# Patient Record
Sex: FEMALE | Race: WHITE | Employment: PART TIME | URBAN - NONMETROPOLITAN AREA
[De-identification: names, ages, dates, MRNs, and addresses within clinical notes are randomized per-mention and may not be internally consistent; named-entity substitution may affect disease eponyms.]

---

## 2017-05-05 LAB — HIV AG/AB: NORMAL

## 2018-02-05 ENCOUNTER — OFFICE VISIT (OUTPATIENT)
Dept: PRIMARY CARE CLINIC | Age: 33
End: 2018-02-05
Payer: COMMERCIAL

## 2018-02-05 VITALS
RESPIRATION RATE: 16 BRPM | BODY MASS INDEX: 33.95 KG/M2 | WEIGHT: 224 LBS | HEART RATE: 72 BPM | TEMPERATURE: 97.6 F | HEIGHT: 68 IN | DIASTOLIC BLOOD PRESSURE: 74 MMHG | OXYGEN SATURATION: 98 % | SYSTOLIC BLOOD PRESSURE: 120 MMHG

## 2018-02-05 DIAGNOSIS — J02.9 SORE THROAT: ICD-10-CM

## 2018-02-05 DIAGNOSIS — J02.0 STREP PHARYNGITIS: Primary | ICD-10-CM

## 2018-02-05 LAB — S PYO AG THROAT QL: POSITIVE

## 2018-02-05 PROCEDURE — 87880 STREP A ASSAY W/OPTIC: CPT | Performed by: NURSE PRACTITIONER

## 2018-02-05 PROCEDURE — 99213 OFFICE O/P EST LOW 20 MIN: CPT | Performed by: NURSE PRACTITIONER

## 2018-02-05 RX ORDER — AMOXICILLIN 875 MG/1
875 TABLET, COATED ORAL 2 TIMES DAILY
Qty: 20 TABLET | Refills: 0 | Status: SHIPPED | OUTPATIENT
Start: 2018-02-05 | End: 2018-02-15

## 2018-02-05 ASSESSMENT — ENCOUNTER SYMPTOMS
SHORTNESS OF BREATH: 0
RHINORRHEA: 1
SORE THROAT: 1
SINUS PRESSURE: 0
COUGH: 0
WHEEZING: 0

## 2018-02-05 NOTE — PROGRESS NOTES
Subjective:      Patient ID: Art Quintero is a 33 y.o. female. Pharyngitis   This is a new problem. The current episode started yesterday. The problem occurs constantly. The problem has been unchanged. Associated symptoms include chills, congestion, a fever, headaches and a sore throat. Pertinent negatives include no chest pain or coughing. The symptoms are aggravated by swallowing, drinking and eating. Treatments tried: dayquil. The treatment provided mild relief. Review of Systems   Constitutional: Positive for chills and fever. Negative for activity change. HENT: Positive for congestion, rhinorrhea and sore throat. Negative for postnasal drip and sinus pressure. Respiratory: Negative for cough, shortness of breath and wheezing. Cardiovascular: Negative for chest pain and palpitations. Neurological: Positive for headaches. Objective:   Physical Exam   Constitutional: She is oriented to person, place, and time. She appears well-developed and well-nourished. No distress. HENT:   Head: Normocephalic and atraumatic. Right Ear: External ear normal.   Left Ear: External ear normal.   Mouth/Throat: Uvula is midline and mucous membranes are normal. Posterior oropharyngeal erythema (moderate) present. No oropharyngeal exudate or posterior oropharyngeal edema. Eyes: Pupils are equal, round, and reactive to light. Neck: Neck supple. Cardiovascular: Normal rate, regular rhythm and normal heart sounds. Pulmonary/Chest: Effort normal and breath sounds normal.   Neurological: She is alert and oriented to person, place, and time. Nursing note and vitals reviewed. Assessment:      1. Strep pharyngitis     2.  Sore throat  POCT rapid strep A           Plan:      Amoxicillin 875mg 1 tablet BID for 10 days  Supportive care  Push fluids  Return if symptoms do not improve or worsen   Return PRN

## 2019-01-22 ENCOUNTER — OFFICE VISIT (OUTPATIENT)
Dept: FAMILY MEDICINE CLINIC | Age: 34
End: 2019-01-22
Payer: COMMERCIAL

## 2019-01-22 VITALS
BODY MASS INDEX: 32.84 KG/M2 | OXYGEN SATURATION: 99 % | HEART RATE: 64 BPM | WEIGHT: 216 LBS | SYSTOLIC BLOOD PRESSURE: 120 MMHG | DIASTOLIC BLOOD PRESSURE: 80 MMHG

## 2019-01-22 DIAGNOSIS — R21 RASH: Primary | ICD-10-CM

## 2019-01-22 PROCEDURE — 99213 OFFICE O/P EST LOW 20 MIN: CPT | Performed by: FAMILY MEDICINE

## 2019-01-22 RX ORDER — DOXYCYCLINE HYCLATE 100 MG/1
100 CAPSULE ORAL 2 TIMES DAILY
Qty: 28 CAPSULE | Refills: 0 | Status: SHIPPED | OUTPATIENT
Start: 2019-01-22 | End: 2019-02-05

## 2019-01-22 ASSESSMENT — PATIENT HEALTH QUESTIONNAIRE - PHQ9
SUM OF ALL RESPONSES TO PHQ QUESTIONS 1-9: 0
2. FEELING DOWN, DEPRESSED OR HOPELESS: 0
SUM OF ALL RESPONSES TO PHQ9 QUESTIONS 1 & 2: 0
1. LITTLE INTEREST OR PLEASURE IN DOING THINGS: 0
SUM OF ALL RESPONSES TO PHQ QUESTIONS 1-9: 0

## 2019-01-22 ASSESSMENT — ENCOUNTER SYMPTOMS: RESPIRATORY NEGATIVE: 1

## 2019-02-05 ENCOUNTER — OFFICE VISIT (OUTPATIENT)
Dept: FAMILY MEDICINE CLINIC | Age: 34
End: 2019-02-05
Payer: COMMERCIAL

## 2019-02-05 VITALS
HEIGHT: 68 IN | WEIGHT: 210.4 LBS | BODY MASS INDEX: 31.89 KG/M2 | HEART RATE: 64 BPM | RESPIRATION RATE: 16 BRPM | DIASTOLIC BLOOD PRESSURE: 78 MMHG | SYSTOLIC BLOOD PRESSURE: 112 MMHG

## 2019-02-05 DIAGNOSIS — L98.9 SKIN LESION: ICD-10-CM

## 2019-02-05 DIAGNOSIS — Z00.00 HEALTHCARE MAINTENANCE: ICD-10-CM

## 2019-02-05 DIAGNOSIS — R21 RASH: Primary | ICD-10-CM

## 2019-02-05 PROCEDURE — 99213 OFFICE O/P EST LOW 20 MIN: CPT | Performed by: FAMILY MEDICINE

## 2019-02-05 ASSESSMENT — PATIENT HEALTH QUESTIONNAIRE - PHQ9
2. FEELING DOWN, DEPRESSED OR HOPELESS: 0
1. LITTLE INTEREST OR PLEASURE IN DOING THINGS: 0
SUM OF ALL RESPONSES TO PHQ QUESTIONS 1-9: 0
SUM OF ALL RESPONSES TO PHQ9 QUESTIONS 1 & 2: 0
SUM OF ALL RESPONSES TO PHQ QUESTIONS 1-9: 0

## 2019-02-11 PROBLEM — E66.9 OBESITY (BMI 30.0-34.9): Status: ACTIVE | Noted: 2019-02-11

## 2019-02-16 ENCOUNTER — HOSPITAL ENCOUNTER (OUTPATIENT)
Dept: LAB | Age: 34
Discharge: HOME | End: 2019-02-16
Payer: COMMERCIAL

## 2019-02-16 DIAGNOSIS — Z00.00 HEALTHCARE MAINTENANCE: ICD-10-CM

## 2019-02-16 LAB
CHOLESTEROL/HDL RATIO: 2.3
CHOLESTEROL: 142 MG/DL
GLUCOSE FASTING: 92 MG/DL (ref 70–99)
HDLC SERPL-MCNC: 61 MG/DL
LDL CHOLESTEROL: 71 MG/DL (ref 0–130)
TRIGL SERPL-MCNC: 49 MG/DL
VLDLC SERPL CALC-MCNC: NORMAL MG/DL (ref 1–30)

## 2019-02-16 PROCEDURE — 80061 LIPID PANEL: CPT

## 2019-02-16 PROCEDURE — 36415 COLL VENOUS BLD VENIPUNCTURE: CPT

## 2019-02-16 PROCEDURE — 82947 ASSAY GLUCOSE BLOOD QUANT: CPT

## 2019-03-04 ENCOUNTER — OFFICE VISIT (OUTPATIENT)
Dept: PRIMARY CARE CLINIC | Age: 34
End: 2019-03-04
Payer: COMMERCIAL

## 2019-03-04 VITALS
SYSTOLIC BLOOD PRESSURE: 110 MMHG | DIASTOLIC BLOOD PRESSURE: 78 MMHG | OXYGEN SATURATION: 100 % | HEART RATE: 67 BPM | BODY MASS INDEX: 31.46 KG/M2 | HEIGHT: 68 IN | TEMPERATURE: 97.1 F | WEIGHT: 207.6 LBS | RESPIRATION RATE: 16 BRPM

## 2019-03-04 DIAGNOSIS — J02.0 STREP THROAT: ICD-10-CM

## 2019-03-04 DIAGNOSIS — J02.9 SORE THROAT: Primary | ICD-10-CM

## 2019-03-04 LAB — S PYO AG THROAT QL: POSITIVE

## 2019-03-04 PROCEDURE — 87880 STREP A ASSAY W/OPTIC: CPT | Performed by: NURSE PRACTITIONER

## 2019-03-04 PROCEDURE — 99213 OFFICE O/P EST LOW 20 MIN: CPT | Performed by: NURSE PRACTITIONER

## 2019-03-04 RX ORDER — AMOXICILLIN 875 MG/1
875 TABLET, COATED ORAL 2 TIMES DAILY
Qty: 20 TABLET | Refills: 0 | Status: SHIPPED | OUTPATIENT
Start: 2019-03-04 | End: 2019-03-14

## 2019-03-04 ASSESSMENT — ENCOUNTER SYMPTOMS
COUGH: 1
SORE THROAT: 1
RHINORRHEA: 1

## 2019-03-14 ENCOUNTER — OFFICE VISIT (OUTPATIENT)
Dept: PRIMARY CARE CLINIC | Age: 34
End: 2019-03-14
Payer: COMMERCIAL

## 2019-03-14 VITALS
DIASTOLIC BLOOD PRESSURE: 86 MMHG | HEIGHT: 68 IN | WEIGHT: 199.8 LBS | BODY MASS INDEX: 30.28 KG/M2 | SYSTOLIC BLOOD PRESSURE: 124 MMHG | RESPIRATION RATE: 16 BRPM | TEMPERATURE: 98.5 F | HEART RATE: 74 BPM

## 2019-03-14 DIAGNOSIS — R21 RASH: Primary | ICD-10-CM

## 2019-03-14 PROCEDURE — 99213 OFFICE O/P EST LOW 20 MIN: CPT | Performed by: FAMILY MEDICINE

## 2019-03-14 ASSESSMENT — PATIENT HEALTH QUESTIONNAIRE - PHQ9
2. FEELING DOWN, DEPRESSED OR HOPELESS: 0
SUM OF ALL RESPONSES TO PHQ9 QUESTIONS 1 & 2: 0
SUM OF ALL RESPONSES TO PHQ QUESTIONS 1-9: 0
1. LITTLE INTEREST OR PLEASURE IN DOING THINGS: 0
SUM OF ALL RESPONSES TO PHQ QUESTIONS 1-9: 0

## 2019-04-15 ENCOUNTER — OFFICE VISIT (OUTPATIENT)
Dept: PRIMARY CARE CLINIC | Age: 34
End: 2019-04-15
Payer: COMMERCIAL

## 2019-04-15 VITALS
DIASTOLIC BLOOD PRESSURE: 76 MMHG | SYSTOLIC BLOOD PRESSURE: 110 MMHG | HEART RATE: 80 BPM | RESPIRATION RATE: 18 BRPM | TEMPERATURE: 98.8 F | OXYGEN SATURATION: 99 % | HEIGHT: 68 IN | BODY MASS INDEX: 29.49 KG/M2 | WEIGHT: 194.6 LBS

## 2019-04-15 DIAGNOSIS — R09.81 NASAL CONGESTION: ICD-10-CM

## 2019-04-15 DIAGNOSIS — J02.9 SORE THROAT: Primary | ICD-10-CM

## 2019-04-15 LAB — S PYO AG THROAT QL: NORMAL

## 2019-04-15 PROCEDURE — 87880 STREP A ASSAY W/OPTIC: CPT | Performed by: NURSE PRACTITIONER

## 2019-04-15 PROCEDURE — 99213 OFFICE O/P EST LOW 20 MIN: CPT | Performed by: NURSE PRACTITIONER

## 2019-04-15 RX ORDER — FLUTICASONE PROPIONATE 50 MCG
2 SPRAY, SUSPENSION (ML) NASAL DAILY
Qty: 1 BOTTLE | Refills: 0 | Status: SHIPPED | OUTPATIENT
Start: 2019-04-15 | End: 2021-03-10

## 2019-04-15 ASSESSMENT — ENCOUNTER SYMPTOMS
SINUS PRESSURE: 1
SINUS PAIN: 1
SORE THROAT: 1
RESPIRATORY NEGATIVE: 1

## 2019-04-15 NOTE — PROGRESS NOTES
is warm. Psychiatric: She has a normal mood and affect. Data reviewed:      ASSESSMENT:   Diagnosis Orders   1. Sore throat  POCT rapid strep A   2. Nasal congestion         PLAN:  Return if symptoms worsen or fail to improve.        Orders Placed This Encounter   Medications    fluticasone (FLONASE) 50 MCG/ACT nasal spray     Si sprays by Nasal route daily     Dispense:  1 Bottle     Refill:  0     Start Flonase  Continue OTC dayquil/motrin etc  Salt water gargles  Rapid strep negative      Electronically signed by DANN Sands CNP on 4/15/19 at 10:19 AM

## 2019-12-05 ENCOUNTER — HOSPITAL ENCOUNTER (OUTPATIENT)
Dept: GENERAL RADIOLOGY | Age: 34
Discharge: HOME | End: 2019-12-07
Payer: COMMERCIAL

## 2019-12-05 ENCOUNTER — OFFICE VISIT (OUTPATIENT)
Dept: PRIMARY CARE CLINIC | Age: 34
End: 2019-12-05
Payer: COMMERCIAL

## 2019-12-05 VITALS
WEIGHT: 184 LBS | HEIGHT: 68 IN | SYSTOLIC BLOOD PRESSURE: 120 MMHG | DIASTOLIC BLOOD PRESSURE: 80 MMHG | TEMPERATURE: 98 F | HEART RATE: 74 BPM | BODY MASS INDEX: 27.89 KG/M2

## 2019-12-05 DIAGNOSIS — R22.0 SWELLING OF NOSE: ICD-10-CM

## 2019-12-05 DIAGNOSIS — R22.0 SWELLING OF NOSE: Primary | ICD-10-CM

## 2019-12-05 PROCEDURE — 70160 X-RAY EXAM OF NASAL BONES: CPT

## 2019-12-05 PROCEDURE — 99213 OFFICE O/P EST LOW 20 MIN: CPT | Performed by: NURSE PRACTITIONER

## 2019-12-05 ASSESSMENT — ENCOUNTER SYMPTOMS
WHEEZING: 0
COUGH: 0
SORE THROAT: 0
RHINORRHEA: 0
SHORTNESS OF BREATH: 0
FACIAL SWELLING: 1

## 2020-01-20 ENCOUNTER — OFFICE VISIT (OUTPATIENT)
Dept: PRIMARY CARE CLINIC | Age: 35
End: 2020-01-20
Payer: COMMERCIAL

## 2020-01-20 VITALS
SYSTOLIC BLOOD PRESSURE: 124 MMHG | DIASTOLIC BLOOD PRESSURE: 68 MMHG | HEIGHT: 68 IN | WEIGHT: 182.6 LBS | HEART RATE: 63 BPM | RESPIRATION RATE: 18 BRPM | OXYGEN SATURATION: 98 % | BODY MASS INDEX: 27.68 KG/M2 | TEMPERATURE: 98.2 F

## 2020-01-20 PROCEDURE — 99213 OFFICE O/P EST LOW 20 MIN: CPT | Performed by: NURSE PRACTITIONER

## 2020-01-20 RX ORDER — OFLOXACIN 3 MG/ML
1 SOLUTION/ DROPS OPHTHALMIC 3 TIMES DAILY
Qty: 1 BOTTLE | Refills: 0 | Status: SHIPPED | OUTPATIENT
Start: 2020-01-20 | End: 2020-01-27

## 2020-01-20 ASSESSMENT — ENCOUNTER SYMPTOMS
RESPIRATORY NEGATIVE: 1
FOREIGN BODY SENSATION: 0
NAUSEA: 0
EYE DISCHARGE: 1
DOUBLE VISION: 0
BLURRED VISION: 0
EYE REDNESS: 1
EYE PAIN: 1
VOMITING: 0
PHOTOPHOBIA: 1
EYE ITCHING: 0

## 2020-01-20 ASSESSMENT — VISUAL ACUITY: OU: 1

## 2020-01-20 NOTE — PROGRESS NOTES
[facility]             [address], [address]                        Telephone [phone]             Fax [phone]     Johanne Timmons  1985  NOW:[id number]   Date of visit:  1/20/2020    Subjective:    Johanne Timmons is a 36 y.o.  female who presents to [facility] today (1/20/2020) for evaluation of:    Chief Complaint   Patient presents with    Other     drainage from leonel.eyes started this morning/ also cough with nonproduction,sore throat started last night nasal drainage        Eye Problem    Both eyes are affected. This is a new problem. The current episode started today. The problem occurs constantly. The problem has been gradually worsening. There was no injury mechanism. The pain is mild (left eye ache). There is known exposure to pink eye. She wears contacts. Associated symptoms include an eye discharge, eye redness, photophobia and a recent URI (X 1 week with rhinorrhea, congestion, cough). Pertinent negatives include no blurred vision, double vision, fever, foreign body sensation, itching, nausea or vomiting. She has tried water for the symptoms. The treatment provided no relief. She has the following problem list:  Patient Active Problem List   Diagnosis    Obesity (BMI 30.0-34. 9)        Current medications are:  Current Outpatient Medications   Medication Sig Dispense Refill    ofloxacin (OCUFLOX) 0.3 % solution Place 1 drop into both eyes 3 times daily for 7 days 1 Bottle 0    Multiple Vitamins-Minerals (MULTIVITAMIN ADULT PO) Take by mouth daily      fluticasone (FLONASE) 50 MCG/ACT nasal spray 2 sprays by Nasal route daily (Patient not taking: Reported on 1/20/2020) 1 Bottle 0     No current facility-administered medications for this visit. She has No Known Allergies. .    She  reports that she has never smoked.  She has never used smokeless tobacco.      Objective:    Vitals:    01/20/20 0946   BP: 124/68   Pulse: 63   Resp: 18   Temp: 98.2 °F (36.8 °C)   TempSrc: Tympanic   SpO2: 98%   Weight: 182 lb 9.6 oz (82.8 kg)   Height: 5' 8\" (1.727 m)     Body mass index is 27.76 kg/m². Review of Systems   Constitutional: Negative. Negative for fever. Eyes: Positive for photophobia, pain (mild ache in left eye), discharge and redness. Negative for blurred vision, double vision, itching and visual disturbance. Respiratory: Negative. Cardiovascular: Negative. Gastrointestinal: Negative for nausea and vomiting. Physical Exam  Vitals signs and nursing note reviewed. Constitutional:       Appearance: She is well-developed. HENT:      Head: Normocephalic. Jaw: There is normal jaw occlusion. Right Ear: Hearing, tympanic membrane, ear canal and external ear normal.      Left Ear: Hearing, tympanic membrane, ear canal and external ear normal.      Nose: Rhinorrhea present. Rhinorrhea is clear. Right Turbinates: Swollen. Left Turbinates: Swollen. Right Sinus: No maxillary sinus tenderness or frontal sinus tenderness. Left Sinus: No maxillary sinus tenderness or frontal sinus tenderness. Mouth/Throat:      Lips: Pink. Mouth: Mucous membranes are moist.      Pharynx: Oropharynx is clear. Eyes:      General: Vision grossly intact. Right eye: Discharge present. Left eye: Discharge present. Extraocular Movements: Extraocular movements intact. Conjunctiva/sclera:      Right eye: Right conjunctiva is injected. Left eye: Left conjunctiva is injected. Pupils: Pupils are equal, round, and reactive to light. Neck:      Musculoskeletal: Normal range of motion and neck supple. Cardiovascular:      Rate and Rhythm: Normal rate and regular rhythm. Heart sounds: Normal heart sounds. Pulmonary:      Effort: Pulmonary effort is normal.      Breath sounds: Normal breath sounds.

## 2020-01-20 NOTE — PATIENT INSTRUCTIONS
inside the lower lid. ? Close your eye for 30 to 60 seconds to let the drops or ointment move around. ? Do not touch the ointment or dropper tip to your eyelashes or any other surface. · Do not share towels, pillows, or washcloths while you have pinkeye. When should you call for help? Call your doctor now or seek immediate medical care if:    · You have pain in your eye, not just irritation on the surface.     · You have a change in vision or loss of vision.     · You have an increase in discharge from the eye.     · Your eye has not started to improve or begins to get worse within 48 hours after you start using antibiotics.     · Pinkeye lasts longer than 7 days.    Watch closely for changes in your health, and be sure to contact your doctor if you have any problems. Where can you learn more? Go to https://[employer]. org and sign in to your [employer] account. Enter Y392 in the [facility] box to learn more about \"Pinkeye: Care Instructions. \"     If you do not have an account, please click on the \"Sign Up Now\" link. Current as of: June 26, 2019  Content Version: 12.3  © 3164-4244 [employer], [employer]orporated. Care instructions adapted under license by [facility] ([facility]). If you have questions about a medical condition or this instruction, always ask your healthcare professional. [place] [address] any warranty or liability for your use of this information.

## 2020-02-11 ENCOUNTER — OFFICE VISIT (OUTPATIENT)
Dept: PRIMARY CARE CLINIC | Age: 35
End: 2020-02-11
Payer: COMMERCIAL

## 2020-02-11 VITALS
HEART RATE: 91 BPM | HEIGHT: 68 IN | WEIGHT: 178 LBS | DIASTOLIC BLOOD PRESSURE: 70 MMHG | SYSTOLIC BLOOD PRESSURE: 104 MMHG | TEMPERATURE: 98 F | OXYGEN SATURATION: 99 % | BODY MASS INDEX: 26.98 KG/M2

## 2020-02-11 LAB — S PYO AG THROAT QL: NORMAL

## 2020-02-11 PROCEDURE — 87880 STREP A ASSAY W/OPTIC: CPT | Performed by: NURSE PRACTITIONER

## 2020-02-11 PROCEDURE — 99213 OFFICE O/P EST LOW 20 MIN: CPT | Performed by: NURSE PRACTITIONER

## 2020-02-11 ASSESSMENT — ENCOUNTER SYMPTOMS
NAUSEA: 0
ABDOMINAL PAIN: 0
GASTROINTESTINAL NEGATIVE: 1
VOMITING: 0
RESPIRATORY NEGATIVE: 1
RHINORRHEA: 0
SORE THROAT: 1
COUGH: 0
SINUS PRESSURE: 0

## 2020-02-11 ASSESSMENT — PATIENT HEALTH QUESTIONNAIRE - PHQ9
1. LITTLE INTEREST OR PLEASURE IN DOING THINGS: 0
2. FEELING DOWN, DEPRESSED OR HOPELESS: 0
SUM OF ALL RESPONSES TO PHQ9 QUESTIONS 1 & 2: 0
SUM OF ALL RESPONSES TO PHQ QUESTIONS 1-9: 0
SUM OF ALL RESPONSES TO PHQ QUESTIONS 1-9: 0

## 2020-02-11 NOTE — PROGRESS NOTES
[facility]             [address], [address]                        Telephone [phone]             Fax [phone]     Maria Dolores Alegre  1985  OHX:[id number]   Date of visit:  2/11/2020    Subjective:    Maria Dolores Alegre is a 36 y.o.  female who presents to [facility] today (2/11/2020) for evaluation of:    Chief Complaint   Patient presents with    Pharyngitis     Patient is here for a sorea throat that started about a month ago. Patient denies any other sx at this time. Pharyngitis   This is a new problem. The current episode started 1 to 4 weeks ago (X 1  month). The problem occurs constantly. The problem has been waxing and waning. Associated symptoms include a sore throat. Pertinent negatives include no abdominal pain, chest pain, chills, congestion, coughing, fatigue, fever, headaches, myalgias, nausea, rash or vomiting. The symptoms are aggravated by swallowing. Treatments tried: dayquil. The treatment provided moderate relief. She has the following problem list:  Patient Active Problem List   Diagnosis    Obesity (BMI 30.0-34. 9)        Current medications are:  Current Outpatient Medications   Medication Sig Dispense Refill    Multiple Vitamins-Minerals (MULTIVITAMIN ADULT PO) Take by mouth daily      fluticasone (FLONASE) 50 MCG/ACT nasal spray 2 sprays by Nasal route daily (Patient not taking: Reported on 1/20/2020) 1 Bottle 0     No current facility-administered medications for this visit. She has No Known Allergies. .    She  reports that she has never smoked.  She has never used smokeless tobacco.      Objective:    Vitals:    02/11/20 0916   BP: 104/70   Site: Left Upper Arm   Position: Sitting   Cuff Size: Medium Adult   Pulse: 91   Temp: 98 °F (36.7 °C)   TempSrc: Tympanic   SpO2: 99%   Weight: 178 lb (80.7 kg)   Height: 5' 8\" (1.727 m)     Body mass index is 27.06 kg/m². Review of Systems   Constitutional: Negative. Negative for appetite change, chills, fatigue and fever. HENT: Positive for sore throat. Negative for congestion, postnasal drip, rhinorrhea and sinus pressure. Respiratory: Negative. Negative for cough. Cardiovascular: Negative. Negative for chest pain. Gastrointestinal: Negative. Negative for abdominal pain, nausea and vomiting. Musculoskeletal: Negative for myalgias. Skin: Negative for rash. Neurological: Negative for headaches. Physical Exam  Vitals signs and nursing note reviewed. Constitutional:       Appearance: She is well-developed. HENT:      Head: Normocephalic. Jaw: There is normal jaw occlusion. Right Ear: Hearing, tympanic membrane, ear canal and external ear normal.      Left Ear: Hearing, tympanic membrane, ear canal and external ear normal.      Nose: Nose normal.      Right Turbinates: Swollen. Left Turbinates: Swollen. Right Sinus: No maxillary sinus tenderness or frontal sinus tenderness. Left Sinus: No maxillary sinus tenderness or frontal sinus tenderness. Mouth/Throat:      Lips: Pink. Mouth: Mucous membranes are moist.      Pharynx: Uvula midline. Posterior oropharyngeal erythema present. Eyes:      Pupils: Pupils are equal, round, and reactive to light. Neck:      Musculoskeletal: Normal range of motion and neck supple. Cardiovascular:      Rate and Rhythm: Normal rate and regular rhythm. Heart sounds: Normal heart sounds. Pulmonary:      Effort: Pulmonary effort is normal.      Breath sounds: Normal breath sounds. Lymphadenopathy:      Cervical: No cervical adenopathy. Skin:     General: Skin is warm and dry. Neurological:      Mental Status: She is alert and oriented to person, place, and time. Psychiatric:         Behavior: Behavior normal.         Thought Content:  Thought content normal.       Assessment and Plan:    Results for POC orders

## 2020-06-01 ENCOUNTER — OFFICE VISIT (OUTPATIENT)
Dept: OBGYN | Age: 35
End: 2020-06-01
Payer: COMMERCIAL

## 2020-06-01 ENCOUNTER — HOSPITAL ENCOUNTER (OUTPATIENT)
Age: 35
Setting detail: SPECIMEN
Discharge: HOME | End: 2020-06-01
Payer: COMMERCIAL

## 2020-06-01 VITALS
DIASTOLIC BLOOD PRESSURE: 64 MMHG | HEART RATE: 66 BPM | BODY MASS INDEX: 27.89 KG/M2 | TEMPERATURE: 96 F | HEIGHT: 68 IN | SYSTOLIC BLOOD PRESSURE: 108 MMHG | WEIGHT: 184 LBS

## 2020-06-01 LAB
CLUE CELLS: ABNORMAL
HYPHAL YEAST: PRESENT
KOH RESULT: NEGATIVE
TRICHOMONAS: NEGATIVE
WET PREP: ABNORMAL
WHITE BLOOD CELLS: NEGATIVE

## 2020-06-01 PROCEDURE — 87070 CULTURE OTHR SPECIMN AEROBIC: CPT

## 2020-06-01 PROCEDURE — 87624 HPV HI-RISK TYP POOLED RSLT: CPT

## 2020-06-01 PROCEDURE — 87210 SMEAR WET MOUNT SALINE/INK: CPT | Performed by: NURSE PRACTITIONER

## 2020-06-01 PROCEDURE — G0145 SCR C/V CYTO,THINLAYER,RESCR: HCPCS

## 2020-06-01 PROCEDURE — 99385 PREV VISIT NEW AGE 18-39: CPT | Performed by: NURSE PRACTITIONER

## 2020-06-01 RX ORDER — METRONIDAZOLE 500 MG/1
500 TABLET ORAL 2 TIMES DAILY WITH MEALS
Qty: 14 TABLET | Refills: 0 | Status: SHIPPED | OUTPATIENT
Start: 2020-06-01 | End: 2020-06-08

## 2020-06-01 NOTE — PROGRESS NOTES
facility-administered medications for this visit. ALLERGIES:  Allergies as of 06/01/2020    (No Known Allergies)       Gynecologic History:  Menarche: 13   Menopause at n/a      Patient's last menstrual period was 05/12/2020. Hormone Exposure: No    Family History of Breast, Ovarian , Colon or Uterine Cancer: Yes      Preventative Health Testing:  Date of Last Pap Smear: 2016    ________________________________________________________________________      Review Of Systems:  General ROS:  negative  Hematological and Lymphatic ROS:negative   Breast ROS: negative  Cardiovascular ROS: negative  Respiratory ROS: negative   Gastrointestinal ROS: negative  Genito-Urinary ROS: positive for vaginal irritation  Psychological ROS: negative  Neurological ROS: negative  Musculoskeletal ROS: negative  Dermatological ROS: negative                                                                                                                                                                                   PHYSICAL Exam:     Constitutional:  Blood pressure 108/64, pulse 66, temperature 96 °F (35.6 °C), temperature source Temporal, height 5' 8\" (1.727 m), weight 184 lb (83.5 kg), last menstrual period 05/12/2020, not currently breastfeeding. General Appearance: This  is a well Developed, well Nourished, well groomed female. Her BMI was reviewed. Skin:  There was a Normal Inspection of the skin without rashes or lesions. There were no rashes. (Papular, Maculopapular, Hives, Pustular, Macular)     There were no lesions (Ulcers, Erythema, Abn. Appearing Nevi)      Lymphatic:  No Lymph Nodes were Palpable in the neck , axilla or groin. Neck and EENT:  The neck was supple. There were no masses   The thyroid was not enlarged and had no masses. PERRLA, Nares Patent No Masses    Respiratory: The lungs were auscultated and found to be clear. There were no rales, rhonchi or wheezes.  There was a good respiratory effort. Cardiovascular: The heart was in a regular rate and rhythm. . No S3 or S4. There was no murmur appreciated. Extremities: The patients extremities were without calf tenderness, edema, or varicosities. There was full range of motion in all four extremities. Pulses in all four extremities    Abdomen: The abdomen was soft and non-tender. There were good bowel sounds in all quadrants and there was no guarding, rebound or rigidity. On evaluation there was no evidence of hepatosplenomegaly and there was no costal vertebral julio cesar tenderness bilaterally. No hernias were appreciated. Psych: The patient had a normal Orientation to: Time, Place, Person, and Situation  Mood and affect appropriate    Breast:  (Chest)  normal appearance, no masses or tenderness, Inspection negative, No nipple retraction or dimpling, No nipple discharge or bleeding, No axillary or supraclavicular adenopathy, Normal to palpation without dominant masses      Pelvic Exam:  External genitalia: normal general appearance  Urinary system: urethral meatus normal  Vaginal: normal without tenderness, induration or masses and discharge, yellow  Cervix: normal appearance and thin prep PAP obtained  Adnexa: normal bimanual exam and non palpable  Uterus: normal single, nontender    Saline prep positive for clue cells and monilia. Vaginal culture obtained    Musculosk:  Normal Gait and station was noted. Digits were evaluated without abnormal findings. Range of motion, stability and strength were evaluated and found to be appropriate for the patients age. ASSESSMENT:      30 y.o. Annual   Diagnosis Orders   1. Well female exam with routine gynecological exam  PAP SMEAR    CBC Auto Differential    Urinalysis with Microscopic    Comprehensive Metabolic Panel   2. Screening for cervical cancer     3. Screening for thyroid disorder  TSH without Reflex    T4, Free   4. Screening for lipoid disorders  Lipid Panel   5.  Encounter for vitamin  TSH without Reflex     Standing Status:   Future     Standing Expiration Date:   10/1/2020    T4, Free     Standing Status:   Future     Standing Expiration Date:   10/1/2020    Vitamin D 25 Hydroxy     Standing Status:   Future     Standing Expiration Date:   9/1/2020   James Nelson POCT Wet Prep       Calista Rowell  6/1/2020

## 2020-06-04 LAB
CULTURE: NORMAL
Lab: NORMAL
SPECIMEN DESCRIPTION: NORMAL

## 2020-06-09 ENCOUNTER — TELEPHONE (OUTPATIENT)
Dept: OBGYN | Age: 35
End: 2020-06-09

## 2020-06-09 LAB — CYTOLOGY REPORT: NORMAL

## 2020-06-09 NOTE — TELEPHONE ENCOUNTER
Called patient and reminded that there are labs that need to be done yet for [facility]. Patient states she will be in this week or the next 2 weeks after vacation.  Patient needs Lipid, CMP, TSH, Vit D, T4, CBC with Diff, and UA

## 2020-06-12 LAB
HPV SAMPLE: NORMAL
HPV, GENOTYPE 16: NOT DETECTED
HPV, GENOTYPE 18: NOT DETECTED
HPV, HIGH RISK OTHER: NOT DETECTED
HPV, INTERPRETATION: NORMAL
SPECIMEN DESCRIPTION: NORMAL

## 2020-07-10 ENCOUNTER — PATIENT MESSAGE (OUTPATIENT)
Dept: OBGYN | Age: 35
End: 2020-07-10

## 2020-07-13 NOTE — TELEPHONE ENCOUNTER
From: Judi Robles  To: DANN Ibrahim - CNP  Sent: 7/10/2020 1:29 PM EDT  Subject: Non-Urgent Medical Question    Hi Hawk Piper,    I still need to come in for my labwork that you placed an order for (we just got back from visiting family). Before I do that though, I remember that you recommend I call into my insurance to make sure it is covered. Is there a name for this labwork that would help my clarify for them what I'm asking is covered? Thank you!     Judi Robles

## 2020-07-14 ENCOUNTER — HOSPITAL ENCOUNTER (OUTPATIENT)
Dept: LAB | Age: 35
Discharge: HOME | End: 2020-07-14
Payer: COMMERCIAL

## 2020-07-14 LAB
-: ABNORMAL
ABSOLUTE EOS #: 0.07 K/UL (ref 0–0.44)
ABSOLUTE IMMATURE GRANULOCYTE: <0.03 K/UL (ref 0–0.3)
ABSOLUTE LYMPH #: 1.22 K/UL (ref 1.1–3.7)
ABSOLUTE MONO #: 0.47 K/UL (ref 0.1–1.2)
ALBUMIN SERPL-MCNC: 3.8 G/DL (ref 3.5–5.2)
ALBUMIN/GLOBULIN RATIO: 1.6 (ref 1–2.5)
ALP BLD-CCNC: 58 U/L (ref 35–104)
ALT SERPL-CCNC: 12 U/L (ref 5–33)
AMORPHOUS: ABNORMAL
ANION GAP SERPL CALCULATED.3IONS-SCNC: 11 MMOL/L (ref 9–17)
AST SERPL-CCNC: 20 U/L
BACTERIA: ABNORMAL
BASOPHILS # BLD: 1 % (ref 0–2)
BASOPHILS ABSOLUTE: 0.04 K/UL (ref 0–0.2)
BILIRUB SERPL-MCNC: 0.47 MG/DL (ref 0.3–1.2)
BILIRUBIN URINE: NEGATIVE
BUN BLDV-MCNC: 10 MG/DL (ref 6–20)
BUN/CREAT BLD: 14 (ref 9–20)
CALCIUM SERPL-MCNC: 8.8 MG/DL (ref 8.6–10.4)
CASTS UA: ABNORMAL /LPF (ref 0–2)
CHLORIDE BLD-SCNC: 102 MMOL/L (ref 98–107)
CHOLESTEROL/HDL RATIO: 2.7
CHOLESTEROL: 178 MG/DL
CO2: 27 MMOL/L (ref 20–31)
COLOR: ABNORMAL
COMMENT UA: ABNORMAL
CREAT SERPL-MCNC: 0.69 MG/DL (ref 0.5–0.9)
CRYSTALS, UA: ABNORMAL /HPF
DIFFERENTIAL TYPE: ABNORMAL
EOSINOPHILS RELATIVE PERCENT: 1 % (ref 1–4)
EPITHELIAL CELLS UA: ABNORMAL /HPF (ref 0–5)
GFR AFRICAN AMERICAN: >60 ML/MIN
GFR NON-AFRICAN AMERICAN: >60 ML/MIN
GFR SERPL CREATININE-BSD FRML MDRD: ABNORMAL ML/MIN/{1.73_M2}
GFR SERPL CREATININE-BSD FRML MDRD: ABNORMAL ML/MIN/{1.73_M2}
GLUCOSE BLD-MCNC: 93 MG/DL (ref 70–99)
GLUCOSE URINE: NEGATIVE
HCT VFR BLD CALC: 35.2 % (ref 36.3–47.1)
HDLC SERPL-MCNC: 65 MG/DL
HEMOGLOBIN: 10.1 G/DL (ref 11.9–15.1)
IMMATURE GRANULOCYTES: 0 %
KETONES, URINE: NEGATIVE
LDL CHOLESTEROL: 94 MG/DL (ref 0–130)
LEUKOCYTE ESTERASE, URINE: NEGATIVE
LYMPHOCYTES # BLD: 23 % (ref 24–43)
MCH RBC QN AUTO: 18.8 PG (ref 25.2–33.5)
MCHC RBC AUTO-ENTMCNC: 28.7 G/DL (ref 25.2–33.5)
MCV RBC AUTO: 65.7 FL (ref 82.6–102.9)
MONOCYTES # BLD: 9 % (ref 3–12)
MUCUS: ABNORMAL
NITRITE, URINE: NEGATIVE
NRBC AUTOMATED: 0 PER 100 WBC
OTHER OBSERVATIONS UA: ABNORMAL
PDW BLD-RTO: 17.3 % (ref 11.8–14.4)
PH UA: 6 (ref 5–6)
PLATELET # BLD: ABNORMAL K/UL (ref 138–453)
PLATELET ESTIMATE: ABNORMAL
PLATELET, FLUORESCENCE: 248 K/UL (ref 138–453)
PLATELET, IMMATURE FRACTION: 3.7 % (ref 1.1–10.3)
PMV BLD AUTO: ABNORMAL FL (ref 8.1–13.5)
POTASSIUM SERPL-SCNC: 4.1 MMOL/L (ref 3.7–5.3)
PROTEIN UA: NEGATIVE
RBC # BLD: 5.36 M/UL (ref 3.95–5.11)
RBC # BLD: ABNORMAL 10*6/UL
RBC UA: ABNORMAL /HPF (ref 0–4)
RENAL EPITHELIAL, UA: ABNORMAL /HPF
SEG NEUTROPHILS: 66 % (ref 36–65)
SEGMENTED NEUTROPHILS ABSOLUTE COUNT: 3.51 K/UL (ref 1.5–8.1)
SODIUM BLD-SCNC: 140 MMOL/L (ref 135–144)
SPECIFIC GRAVITY UA: 1.02 (ref 1.01–1.02)
THYROXINE, FREE: 1.05 NG/DL (ref 0.93–1.7)
TOTAL PROTEIN: 6.2 G/DL (ref 6.4–8.3)
TRICHOMONAS: ABNORMAL
TRIGL SERPL-MCNC: 93 MG/DL
TSH SERPL DL<=0.05 MIU/L-ACNC: 2.32 MIU/L (ref 0.3–5)
TURBIDITY: ABNORMAL
URINE HGB: NEGATIVE
UROBILINOGEN, URINE: NORMAL
VITAMIN D 25-HYDROXY: 21.3 NG/ML (ref 30–100)
VLDLC SERPL CALC-MCNC: NORMAL MG/DL (ref 1–30)
WBC # BLD: 5.3 K/UL (ref 3.5–11.3)
WBC # BLD: ABNORMAL 10*3/UL
WBC UA: ABNORMAL /HPF (ref 0–4)
YEAST: ABNORMAL

## 2020-07-14 PROCEDURE — 83540 ASSAY OF IRON: CPT

## 2020-07-14 PROCEDURE — 83550 IRON BINDING TEST: CPT

## 2020-07-14 PROCEDURE — 85025 COMPLETE CBC W/AUTO DIFF WBC: CPT

## 2020-07-14 PROCEDURE — 82306 VITAMIN D 25 HYDROXY: CPT

## 2020-07-14 PROCEDURE — 81001 URINALYSIS AUTO W/SCOPE: CPT

## 2020-07-14 PROCEDURE — 84443 ASSAY THYROID STIM HORMONE: CPT

## 2020-07-14 PROCEDURE — 82728 ASSAY OF FERRITIN: CPT

## 2020-07-14 PROCEDURE — 80061 LIPID PANEL: CPT

## 2020-07-14 PROCEDURE — 36415 COLL VENOUS BLD VENIPUNCTURE: CPT

## 2020-07-14 PROCEDURE — 80053 COMPREHEN METABOLIC PANEL: CPT

## 2020-07-14 PROCEDURE — 85055 RETICULATED PLATELET ASSAY: CPT

## 2020-07-14 PROCEDURE — 84439 ASSAY OF FREE THYROXINE: CPT

## 2020-07-15 LAB
FERRITIN: 7 UG/L (ref 13–150)
IRON SATURATION: 7 % (ref 20–55)
IRON: 25 UG/DL (ref 37–145)
TOTAL IRON BINDING CAPACITY: 381 UG/DL (ref 250–450)
UNSATURATED IRON BINDING CAPACITY: 356 UG/DL (ref 112–347)

## 2020-07-16 RX ORDER — FERROUS SULFATE 325(65) MG
325 TABLET ORAL 2 TIMES DAILY WITH MEALS
Qty: 180 TABLET | Refills: 1 | Status: SHIPPED | OUTPATIENT
Start: 2020-07-16

## 2020-07-17 ENCOUNTER — VIRTUAL VISIT (OUTPATIENT)
Dept: FAMILY MEDICINE CLINIC | Age: 35
End: 2020-07-17
Payer: COMMERCIAL

## 2020-07-17 PROBLEM — E66.3 OVERWEIGHT (BMI 25.0-29.9): Status: ACTIVE | Noted: 2019-02-11

## 2020-07-17 PROCEDURE — 99213 OFFICE O/P EST LOW 20 MIN: CPT | Performed by: FAMILY MEDICINE

## 2020-07-17 NOTE — PROGRESS NOTES
2020    TELEHEALTH EVALUATION -- Audio/Visual (During HFFPQ-55 public health emergency)    HPI:    Lion Adams (:  1985) has requested an audio/video evaluation for the following concern(s):    She states that she has been feeling well. She saw [facility] recently for her annual exam and Pap test.  Keyon Locke ordered labs. Fede Johansen had her labs done earlier this month. The labs showed anemia. Brigida prescribed iron. She has not started the iron yet. She states that her menses are regular. She states that she bleeds for about 5 days each month. She does not feel that her blood loss is heavy. She has a history of anemia. She does not take a multivitamin routinely. Review of Systems    Prior to Visit Medications    Medication Sig Taking? Authorizing Provider   ferrous sulfate (IRON 325) 325 (65 Fe) MG tablet Take 1 tablet by mouth 2 times daily (with meals)  Patient not taking: Reported on 2020  DANN Dunne CNP   fluticasone (FLONASE) 50 MCG/ACT nasal spray 2 sprays by Nasal route daily  Patient not taking: Reported on 2020  DANN Ross CNP   Multiple Vitamins-Minerals (MULTIVITAMIN ADULT PO) Take by mouth daily  Historical Provider, MD       Social History     Tobacco Use    Smoking status: Never Smoker    Smokeless tobacco: Never Used   Substance Use Topics    Alcohol use: No    Drug use: No        She has the following allergies:  Patient has no known allergies. She has the following problem list:  Patient Active Problem List   Diagnosis    Overweight (BMI 25.0-29. 9)         PHYSICAL EXAMINATION:    Vital Signs: (As obtained by patient/caregiver or practitioner observation)    Patient-Reported Vitals 2020   Patient-Reported Weight 185lbs   Patient-Reported Height 5 8         Constitutional:   She appears well-developed and well-nourished. She is in no apparent distress. Mental status:  She is alert and awake.   She is oriented to person/place/time. She is able to follow commands. Eyes:  EOM are normal  Sclera are normal  There is no visible discharge. HENT:   Normocephalic, atraumatic. Mouth/throat: Mucous membranes are moist.    Neck: There is no visualized mass. Pulmonary/Chest: The respiratory effort is normal.  There are no visualized signs of difficulty breathing or respiratory distress. Musculoskeletal:  There is a normal gait with no signs of ataxia. There is normal range of motion of the neck. Neurological:   There is no facial asymmetry (cranial nerve 7 motor function). (Exam is limited due to video visit.)   There is no gaze palsy. Skin:   There is no significant exanthematous lesions or discoloration noted on facial skin. Psychiatric:  Affect is normal.         Other pertinent observable physical exam findings:  She responds to questions appropriately. Speech is clear. There is no observed dyspnea with conversation. Dress and grooming are appropriate.      Results of labs done 7/14/2020 were reviewed with the patient:  Hospital Outpatient Visit on 07/14/2020   Component Date Value Ref Range Status    Vit D, 25-Hydroxy 07/14/2020 21.3* 30.0 - 100.0 ng/mL Final    Comment:    Reference Range:  Vitamin D status         Range   Deficiency              <20 ng/mL   Mild Deficiency       20-30 ng/mL   Sufficiency           ng/mL   Toxicity               >100 ng/mL      Thyroxine, Free 07/14/2020 1.05  0.93 - 1.70 ng/dL Final    TSH 07/14/2020 2.32  0.30 - 5.00 mIU/L Final    Cholesterol 07/14/2020 178  <200 mg/dL Final    Comment:    Cholesterol Guidelines:      <200  Desirable   200-240  Borderline      >240  Undesirable         HDL 07/14/2020 65  >40 mg/dL Final    Comment:    HDL Guidelines:    <40     Undesirable   40-59    Borderline    >59     Desirable         LDL Cholesterol 07/14/2020 94  0 - 130 mg/dL Final    Comment:    LDL Guidelines:     <100    Desirable 100-129   Near to/above Desirable   130-159   Borderline      >159   Undesirable     Direct (measured) LDL and calculated LDL are not interchangeable tests.  Chol/HDL Ratio 07/14/2020 2.7  <5 Final            Triglycerides 07/14/2020 93  <150 mg/dL Final    Comment:    Triglyceride Guidelines:     <150   Desirable   150-199  Borderline   200-499  High     >499   Very high   Based on AHA Guidelines for fasting triglyceride, October 2012.  VLDL 07/14/2020 NOT REPORTED  1 - 30 mg/dL Final    Glucose 07/14/2020 93  70 - 99 mg/dL Final    BUN 07/14/2020 10  6 - 20 mg/dL Final    CREATININE 07/14/2020 0.69  0.50 - 0.90 mg/dL Final    Bun/Cre Ratio 07/14/2020 14  9 - 20 Final    Calcium 07/14/2020 8.8  8.6 - 10.4 mg/dL Final    Sodium 07/14/2020 140  135 - 144 mmol/L Final    Potassium 07/14/2020 4.1  3.7 - 5.3 mmol/L Final    Chloride 07/14/2020 102  98 - 107 mmol/L Final    CO2 07/14/2020 27  20 - 31 mmol/L Final    Anion Gap 07/14/2020 11  9 - 17 mmol/L Final    Alkaline Phosphatase 07/14/2020 58  35 - 104 U/L Final    ALT 07/14/2020 12  5 - 33 U/L Final    AST 07/14/2020 20  <32 U/L Final    Total Bilirubin 07/14/2020 0.47  0.3 - 1.2 mg/dL Final    Total Protein 07/14/2020 6.2* 6.4 - 8.3 g/dL Final    Alb 07/14/2020 3.8  3.5 - 5.2 g/dL Final    Albumin/Globulin Ratio 07/14/2020 1.6  1.0 - 2.5 Final    GFR Non- 07/14/2020 >60  >60 mL/min Final    GFR  07/14/2020 >60  >60 mL/min Final    GFR Comment 07/14/2020        Final    Comment: Average GFR for 30-36 years old:   80 mL/min/1.73sq m  Chronic Kidney Disease:   <60 mL/min/1.73sq m  Kidney failure:   <15 mL/min/1.73sq m              eGFR calculated using average adult body mass.  Additional eGFR calculator available at:        [email or web address].br            GFR Staging 07/14/2020 NOT REPORTED   Final    WBC 07/14/2020 5.3  3.5 - 11.3 k/uL Final    RBC 07/14/2020 5.36* 3.95 - 5.11 m/uL Final    Hemoglobin 07/14/2020 10.1* 11.9 - 15.1 g/dL Final    Hematocrit 07/14/2020 35.2* 36.3 - 47.1 % Final    MCV 07/14/2020 65.7* 82.6 - 102.9 fL Final    MCH 07/14/2020 18.8* 25.2 - 33.5 pg Final    MCHC 07/14/2020 28.7  25.2 - 33.5 g/dL Final    RDW 07/14/2020 17.3* 11.8 - 14.4 % Final    Platelets 64/07/2736 See Reflexed IPF Result  138 - 453 k/uL Final    MPV 07/14/2020 Abnormal  8.1 - 13.5 fL Final    NRBC Automated 07/14/2020 0.0  0.0 per 100 WBC Final    Differential Type 07/14/2020 NOT REPORTED   Final    WBC Morphology 07/14/2020 NOT REPORTED   Final    RBC Morphology 07/14/2020 NOT REPORTED   Final    Platelet Estimate 81/52/9475 NOT REPORTED   Final    Seg Neutrophils 07/14/2020 66* 36 - 65 % Final    Lymphocytes 07/14/2020 23* 24 - 43 % Final    Monocytes 07/14/2020 9  3 - 12 % Final    Eosinophils % 07/14/2020 1  1 - 4 % Final    Basophils 07/14/2020 1  0 - 2 % Final    Immature Granulocytes 07/14/2020 0  0 % Final    Segs Absolute 07/14/2020 3.51  1.50 - 8.10 k/uL Final    Absolute Lymph # 07/14/2020 1.22  1.10 - 3.70 k/uL Final    Absolute Mono # 07/14/2020 0.47  0.10 - 1.20 k/uL Final    Absolute Eos # 07/14/2020 0.07  0.00 - 0.44 k/uL Final    Basophils Absolute 07/14/2020 0.04  0.00 - 0.20 k/uL Final    Absolute Immature Granulocyte 07/14/2020 <0.03  0.00 - 0.30 k/uL Final    Color, UA 07/14/2020 NOT REPORTED  YELLOW Final    Turbidity UA 07/14/2020 NOT REPORTED  CLEAR Final    Glucose, Ur 07/14/2020 NEGATIVE  NEGATIVE Final    Bilirubin Urine 07/14/2020 NEGATIVE  NEGATIVE Final    Ketones, Urine 07/14/2020 NEGATIVE  NEGATIVE Final    Specific Gravity, UA 07/14/2020 1.025  1.010 - 1.025 Final    Urine Hgb 07/14/2020 NEGATIVE  NEGATIVE Final    pH, UA 07/14/2020 6.0  5.0 - 6.0 Final    Protein, UA 07/14/2020 NEGATIVE  NEGATIVE Final    Urobilinogen, Urine 07/14/2020 Normal  Normal Final    Nitrite, Urine 07/14/2020 NEGATIVE  NEGATIVE Final    Leukocyte Esterase, Urine 07/14/2020 NEGATIVE  NEGATIVE Final    Urinalysis Comments 07/14/2020 NOT REPORTED   Final    - 07/14/2020        Final    WBC, UA 07/14/2020 None  0 - 4 /HPF Final    RBC, UA 07/14/2020 0 TO 4  0 - 4 /HPF Final    Casts UA 07/14/2020 NOT REPORTED  0 - 2 /LPF Final    Crystals, UA 07/14/2020 NOT REPORTED  None /HPF Final    Epithelial Cells UA 07/14/2020 5 TO 10  0 - 5 /HPF Final    Renal Epithelial, UA 07/14/2020 NOT REPORTED  0 /HPF Final    Bacteria, UA 07/14/2020 1+* None Final    Mucus, UA 07/14/2020 4+* None Final    Trichomonas, UA 07/14/2020 NOT REPORTED  None Final    Amorphous, UA 07/14/2020 NOT REPORTED  None Final    Other Observations UA 07/14/2020 NOT REPORTED  NOT REQ. Final    Yeast, UA 07/14/2020 NOT REPORTED  None Final    Platelet, Immature Fraction 07/14/2020 3.7  1.1 - 10.3 % Final    Platelet, Fluorescence 07/14/2020 248  138 - 453 k/uL Final    Ferritin 07/14/2020 7* 13 - 150 ug/L Final    Iron 07/14/2020 25* 37 - 145 ug/dL Final    TIBC 07/14/2020 381  250 - 450 ug/dL Final    Iron Saturation 07/14/2020 7* 20 - 55 % Final    UIBC 07/14/2020 356* 112 - 347 ug/dL Final        ASSESSMENT/PLAN:  1. Iron deficiency anemia, unspecified iron deficiency anemia type  I recommended that she begin the iron that [facility] prescribed. Labs were ordered to be done in one month:  - CBC With Auto Differential; Future  - Ferritin; Future  - Iron And TIBC; Future    She will be contacted when the labs are available. 2. Vitamin D deficiency  I advised her to begin Vitamin D 2000 units daily. No follow-ups on file. Bryanna Pichardo is a 30 y.o. female being evaluated by a Virtual Visit (video visit) encounter to address concerns as mentioned above. A caregiver was present when appropriate.  Due to this being a TeleHealth encounter (During JOMNP-84 public health emergency), evaluation of the following organ systems was limited:

## 2021-03-01 ENCOUNTER — OFFICE VISIT (OUTPATIENT)
Dept: FAMILY MEDICINE CLINIC | Age: 36
End: 2021-03-01
Payer: COMMERCIAL

## 2021-03-01 VITALS
BODY MASS INDEX: 29.28 KG/M2 | HEART RATE: 56 BPM | WEIGHT: 193.2 LBS | HEIGHT: 68 IN | DIASTOLIC BLOOD PRESSURE: 78 MMHG | OXYGEN SATURATION: 100 % | TEMPERATURE: 98.4 F | SYSTOLIC BLOOD PRESSURE: 128 MMHG

## 2021-03-01 DIAGNOSIS — T50.Z95A IMMUNIZATION REACTION, INITIAL ENCOUNTER: ICD-10-CM

## 2021-03-01 DIAGNOSIS — T78.40XA ALLERGIC REACTION, INITIAL ENCOUNTER: Primary | ICD-10-CM

## 2021-03-01 PROCEDURE — 99213 OFFICE O/P EST LOW 20 MIN: CPT | Performed by: NURSE PRACTITIONER

## 2021-03-01 ASSESSMENT — VISUAL ACUITY: OU: 1

## 2021-03-01 ASSESSMENT — PATIENT HEALTH QUESTIONNAIRE - PHQ9
1. LITTLE INTEREST OR PLEASURE IN DOING THINGS: 0
SUM OF ALL RESPONSES TO PHQ QUESTIONS 1-9: 0
2. FEELING DOWN, DEPRESSED OR HOPELESS: 0
SUM OF ALL RESPONSES TO PHQ QUESTIONS 1-9: 0

## 2021-03-01 ASSESSMENT — ENCOUNTER SYMPTOMS
FACIAL SWELLING: 1
TROUBLE SWALLOWING: 0
SORE THROAT: 0
NAUSEA: 0
COUGH: 0
EYE ITCHING: 1
RHINORRHEA: 0
VOICE CHANGE: 0

## 2021-03-01 NOTE — PROGRESS NOTES
[address] [place] [address] [address] [facility], APRN-[facility]  [address]  Phone:  [phone]  Fax:  [phone]  Zayra Campbell is a 29 y.o. female who presents today for her medical conditions/complaints as noted below. Sallya Class c/o of Other (Friday the 1st dose of the vaccine was given, and by Saturday eye started to swell and the lips became itchy)      HPI:     Other  This is a new (Had Moderna vaccine on Friday.) problem. The current episode started yesterday. The problem has been waxing and waning. Associated symptoms include chills and a rash. Pertinent negatives include no arthralgias, chest pain, congestion, coughing, fever, myalgias, nausea or sore throat. Associated symptoms comments: Facial swelling and lip swelling. Lips dry and cracked. . Nothing aggravates the symptoms. Treatments tried: cortisone 10 on the face. The treatment provided no relief.        Wt Readings from Last 3 Encounters:   03/01/21 193 lb 3.2 oz (87.6 kg)   06/01/20 184 lb (83.5 kg)   02/11/20 178 lb (80.7 kg)       Temp Readings from Last 3 Encounters:   03/01/21 98.4 °F (36.9 °C)   06/01/20 96 °F (35.6 °C) (Temporal)   02/11/20 98 °F (36.7 °C) (Tympanic)       BP Readings from Last 3 Encounters:   03/01/21 128/78   06/01/20 108/64   02/11/20 104/70       Pulse Readings from Last 3 Encounters:   03/01/21 56   06/01/20 66   02/11/20 91              Past Medical History:   Diagnosis Date    Vaginal delivery 5/15/15    Girl, no complications      Past Surgical History:   Procedure Laterality Date    WISDOM TOOTH EXTRACTION      age 16 - 3 teeth     Family History   Problem Relation Age of Onset    Breast Cancer Maternal Grandmother 79     Social History     Tobacco Use    Smoking status: Never Smoker    Smokeless tobacco: Never Used   Substance Use Topics    Alcohol use: No      Current Outpatient Medications   Medication Sig Dispense Refill  ferrous sulfate (IRON 325) 325 (65 Fe) MG tablet Take 1 tablet by mouth 2 times daily (with meals) 180 tablet 1    fluticasone (FLONASE) 50 MCG/ACT nasal spray 2 sprays by Nasal route daily 1 Bottle 0    Multiple Vitamins-Minerals (MULTIVITAMIN ADULT PO) Take by mouth daily       No current facility-administered medications for this visit. No Known Allergies    No exam data present    Subjective:      Review of Systems   Constitutional: Positive for chills. Negative for fever. HENT: Positive for facial swelling. Negative for congestion, rhinorrhea, sore throat, trouble swallowing and voice change. Mild swelling of the lips of the left side of the face. Erythema surrounding the left lips. Eyes: Positive for itching. Swelling of the eyelids     Respiratory: Negative for cough. Cardiovascular: Negative for chest pain. Gastrointestinal: Negative for nausea. Musculoskeletal: Negative for arthralgias and myalgias. Skin: Positive for rash. Objective:     /78   Pulse 56   Temp 98.4 °F (36.9 °C)   Ht 5' 8\" (1.727 m)   Wt 193 lb 3.2 oz (87.6 kg)   SpO2 100%   BMI 29.38 kg/m²     Physical Exam  Vitals signs reviewed. HENT:      Head: Normocephalic. Right Ear: Tympanic membrane, ear canal and external ear normal.      Left Ear: Tympanic membrane, ear canal and external ear normal.      Nose: Nose normal.      Mouth/Throat:      Mouth: Mucous membranes are moist.      Pharynx: No oropharyngeal exudate or posterior oropharyngeal erythema. Eyes:      General: Vision grossly intact. Right eye: No discharge. Left eye: No discharge. Extraocular Movements: Extraocular movements intact. Left eye: Normal extraocular motion. Conjunctiva/sclera:      Right eye: Right conjunctiva is not injected. Left eye: Left conjunctiva is not injected. Pupils: Pupils are equal, round, and reactive to light.      Cardiovascular: Rate and Rhythm: Normal rate and regular rhythm. Pulses: Normal pulses. Heart sounds: Normal heart sounds. Pulmonary:      Effort: Pulmonary effort is normal.      Breath sounds: Normal breath sounds. No stridor. Skin:     General: Skin is warm and dry. Capillary Refill: Capillary refill takes less than 2 seconds. Neurological:      Mental Status: She is alert. Assessment:      Diagnosis Orders   1. Allergic reaction, initial encounter     2. Immunization reaction, initial encounter       No results found for this visit on 03/01/21. Plan:       Zyrtec 10 mg in the AM.  Once home - Benadryl 25 mg. Benadryl 50 mg at bedtime. May use the hydrocortisone to the face as directed. Follow up with primary care provider in 1 to 2 days if needed. Report reaction to [facility]. Discuss with Dr. Romaine Caal being vaccinated again. Patient Instructions     Zyrtec 10 mg in the AM.  Once home - Benadryl 25 mg. Benadryl 50 mg at bedtime. May use the hydrocortisone to the face as directed. Follow up with primary care provider in 1 to 2 days if needed. Report reaction to [facility]. Discuss with Dr. Romaine Caal being vaccinated again. Patient Education        Allergic Reaction: Care Instructions  Your Care Instructions     An allergic reaction is an excessive response from your immune system to a medicine, chemical, food, insect bite, or other substance. A reaction can range from mild to life-threatening. Some people have a mild rash, hives, and itching or stomach cramps. In severe reactions, swelling of your tongue and throat can close up your airway so that you cannot breathe. Follow-up care is a key part of your treatment and safety. Be sure to make and go to all appointments, and call your doctor if you are having problems. It's also a good idea to know your test results and keep a list of the medicines you take. How can you care for yourself at home? · If you know what caused your allergic reaction, be sure to avoid it. Your allergy may become more severe each time you have a reaction. · Take an over-the-counter antihistamine, such as cetirizine (Zyrtec) or loratadine (Claritin), to treat mild symptoms. Read and follow directions on the label. Some antihistamines can make you feel sleepy. Do not give antihistamines to a child unless you have checked with your doctor first. Mild symptoms include sneezing or an itchy or runny nose; an itchy mouth; a few hives or mild itching; and mild nausea or stomach discomfort. · Do not scratch hives or a rash. Put a cold, moist towel on them or take cool baths to relieve itching. Put ice packs on hives, swelling, or insect stings for 10 to 15 minutes at a time. Put a thin cloth between the ice pack and your skin. Do not take hot baths or showers. They will make the itching worse. · Your doctor may prescribe a shot of epinephrine to carry with you in case you have a severe reaction. Learn how to give yourself the shot and keep it with you at all times. Make sure it is not . · Go to the emergency room every time you have a severe reaction, even if you have used your shot of epinephrine and are feeling better. Symptoms can come back after a shot. · Wear medical alert jewelry that lists your allergies. You can buy this at most drugstores. · If your child has a severe allergy, make sure that his or her teachers, babysitters, coaches, and other caregivers know about the allergy. They should have an epinephrine shot, know how and when to give it, and have a plan to take your child to the hospital.  When should you call for help? Give an epinephrine shot if:    · You think you are having a severe allergic reaction.     · You have symptoms in more than one body area, such as mild nausea and an itchy mouth. After giving an epinephrine shot call 911, even if you feel better.   Call 911 if:   · You have symptoms of a severe allergic reaction. These may include:  ? Sudden raised, red areas (hives) all over your body. ? Swelling of the throat, mouth, lips, or tongue. ? Trouble breathing. ? Passing out (losing consciousness). Or you may feel very lightheaded or suddenly feel weak, confused, or restless.     · You have been given an epinephrine shot, even if you feel better. Call your doctor now or seek immediate medical care if:    · You have symptoms of an allergic reaction, such as:  ? A rash or hives (raised, red areas on the skin). ? Itching. ? Swelling. ? Belly pain, nausea, or vomiting. Watch closely for changes in your health, and be sure to contact your doctor if:    · You do not get better as expected. Where can you learn more? Go to https://[email or web address].[employer]iBe[employer]. org and sign in to your [employer] account. Enter F947 in the [employer] box to learn more about \"Allergic Reaction: Care Instructions. \"     If you do not have an account, please click on the \"Sign Up Now\" link. Current as of: June 29, 2020               Content Version: 12.6  © 4145-5525 [employer]. Care instructions adapted under license by [facility] ([facility]). If you have questions about a medical condition or this instruction, always ask your healthcare professional. [place] [address] any warranty or liability for your use of this information. Patient/Caregiver instructed on use, benefit, and side effects of prescribed medications. All patient/parent/caregiver questions answered. Patient/parent/caregiver voiced understanding. Reviewed health maintenance. Instructed to continue current medications, diet and exercise. Patient agreed with treatment plan. Follow up as directed.            Electronically signed by DANN Scott NP on3/1/2021

## 2021-03-01 NOTE — PATIENT INSTRUCTIONS
Zyrtec 10 mg in the AM.  Once home - Benadryl 25 mg. Benadryl 50 mg at bedtime. May use the hydrocortisone to the face as directed. Follow up with primary care provider in 1 to 2 days if needed. Report reaction to [facility]. Discuss with Dr. Kehinde Montague being vaccinated again. Patient Education        Allergic Reaction: Care Instructions  Your Care Instructions     An allergic reaction is an excessive response from your immune system to a medicine, chemical, food, insect bite, or other substance. A reaction can range from mild to life-threatening. Some people have a mild rash, hives, and itching or stomach cramps. In severe reactions, swelling of your tongue and throat can close up your airway so that you cannot breathe. Follow-up care is a key part of your treatment and safety. Be sure to make and go to all appointments, and call your doctor if you are having problems. It's also a good idea to know your test results and keep a list of the medicines you take. How can you care for yourself at home? · If you know what caused your allergic reaction, be sure to avoid it. Your allergy may become more severe each time you have a reaction. · Take an over-the-counter antihistamine, such as cetirizine (Zyrtec) or loratadine (Claritin), to treat mild symptoms. Read and follow directions on the label. Some antihistamines can make you feel sleepy. Do not give antihistamines to a child unless you have checked with your doctor first. Mild symptoms include sneezing or an itchy or runny nose; an itchy mouth; a few hives or mild itching; and mild nausea or stomach discomfort. · Do not scratch hives or a rash. Put a cold, moist towel on them or take cool baths to relieve itching. Put ice packs on hives, swelling, or insect stings for 10 to 15 minutes at a time. Put a thin cloth between the ice pack and your skin. Do not take hot baths or showers. They will make the itching worse. · Your doctor may prescribe a shot of epinephrine to carry with you in case you have a severe reaction. Learn how to give yourself the shot and keep it with you at all times. Make sure it is not . · Go to the emergency room every time you have a severe reaction, even if you have used your shot of epinephrine and are feeling better. Symptoms can come back after a shot. · Wear medical alert jewelry that lists your allergies. You can buy this at most drugstores. · If your child has a severe allergy, make sure that his or her teachers, babysitters, coaches, and other caregivers know about the allergy. They should have an epinephrine shot, know how and when to give it, and have a plan to take your child to the hospital.  When should you call for help? Give an epinephrine shot if:    · You think you are having a severe allergic reaction.     · You have symptoms in more than one body area, such as mild nausea and an itchy mouth. After giving an epinephrine shot call 911, even if you feel better. Call 911 if:    · You have symptoms of a severe allergic reaction. These may include:  ? Sudden raised, red areas (hives) all over your body. ? Swelling of the throat, mouth, lips, or tongue. ? Trouble breathing. ? Passing out (losing consciousness). Or you may feel very lightheaded or suddenly feel weak, confused, or restless.     · You have been given an epinephrine shot, even if you feel better. Call your doctor now or seek immediate medical care if:    · You have symptoms of an allergic reaction, such as:  ? A rash or hives (raised, red areas on the skin). ? Itching. ? Swelling. ? Belly pain, nausea, or vomiting. Watch closely for changes in your health, and be sure to contact your doctor if:    · You do not get better as expected. Where can you learn more?

## 2021-03-02 DIAGNOSIS — T50.Z95A ADVERSE EFFECT OF VACCINE, INITIAL ENCOUNTER: Primary | ICD-10-CM

## 2021-03-03 ENCOUNTER — TELEPHONE (OUTPATIENT)
Dept: FAMILY MEDICINE CLINIC | Age: 36
End: 2021-03-03

## 2021-03-10 ENCOUNTER — OFFICE VISIT (OUTPATIENT)
Dept: FAMILY MEDICINE CLINIC | Age: 36
End: 2021-03-10
Payer: COMMERCIAL

## 2021-03-10 VITALS
SYSTOLIC BLOOD PRESSURE: 106 MMHG | BODY MASS INDEX: 28.95 KG/M2 | TEMPERATURE: 98.6 F | WEIGHT: 191 LBS | HEIGHT: 68 IN | DIASTOLIC BLOOD PRESSURE: 72 MMHG

## 2021-03-10 DIAGNOSIS — T88.1XXD: Primary | ICD-10-CM

## 2021-03-10 PROCEDURE — 99243 OFF/OP CNSLTJ NEW/EST LOW 30: CPT | Performed by: NURSE PRACTITIONER

## 2021-03-10 NOTE — PROGRESS NOTES
Symptoms experienced by patient  Runny nose  No Circles under eyes No Post nasal drip No Difficulty breathing No   Stuffy nose No Grumpiness No Hoarseness No Short of breath No   Sniffling  No Fatigue No Face pain/pressure No Tight/heavy chest No   Sneezing No Nosebleeds No Sore throat No cough No   Blowing nose No Nasal/sinus surgery No Headache  No Cough up mucus No   Itchy/teary eyes No Nasal polyps No Upper teeth hurt No Cough up blood No   Itchy ears No Hearing loss No Night cough No Chest pain No   Itchy nose No Ear problems No Throat clearing No Asthma No   Itchy throat No Tubes in ears No Bad breath No Wheezing No   Itchy roof of mouth No Snoring No Bad taste in mouth No Pneumonia No   Nose rubbing No Mouth breathing No  Ankle swelling No    Overbite No  Difficulty breathing on exertion No    Drooling (toddler) No           Does patient experience? Heartburn and indigestion No  Vomiting easily No  Use of antacids (Rolaids, Tums, Maalox) No  Regurgitation of stomach contents No  Chronic cough worse after meals No  Chronic cough cough worse after laying down No  Chronic hoarseness or voice change No  Chest pain No  Stomach pain No  Neck pain No  Sore throat-frequent No  Feeling of throat closing or something stuck in throat No  Frequent burps or hiccups No  Adult onset asthma No  Asthma not relieved with usual treatment No  Anemia No  Asthma worse after meals, alcohol, lying down, bending to tie shoes, or after heartburn No  Chronic sinus disease No    Within the last month, how did the following problems affect the patient?   0=No Problem; 5=Severe Problem    Hoarseness or a problem with your voice 0  Clearing your throat 0  Excess throat mucus or postnasal drip 0  Difficulty swallowing food, liquids, pills 0  Coughing after you ate or after lying down 0  Breathing difficulties or choking episodes 0  Troublesome or annoying cough 0  Sensations of something sticking in your throat or a lump in your throat 0

## 2021-03-10 NOTE — PROGRESS NOTES
activity: Yes     Partners: Male     Birth control/protection: Rhythm     Comment: Menarche age 16   Lifestyle    Physical activity     Days per week: Not on file     Minutes per session: Not on file    Stress: Not on file   Relationships    Social connections     Talks on phone: Not on file     Gets together: Not on file     Attends [employer] service: Not on file     Active member of club or organization: Not on file     Attends meetings of clubs or organizations: Not on file     Relationship status: Not on file    Intimate partner violence     Fear of current or ex partner: Not on file     Emotionally abused: Not on file     Physically abused: Not on file     Forced sexual activity: Not on file   Other Topics Concern    Not on file   Social History Narrative    Not on file        Family History   Problem Relation Age of Onset    Breast Cancer Maternal Grandmother 79       Vitals:    03/10/21 1355   BP: 106/72   Site: Right Upper Arm   Position: Sitting   Cuff Size: Medium Adult   Temp: 98.6 °F (37 °C)   TempSrc: Tympanic   Weight: 191 lb (86.6 kg)   Height: 5' 8\" (1.727 m)     Estimated body mass index is 29.04 kg/m² as calculated from the following:    Height as of this encounter: 5' 8\" (1.727 m). Weight as of this encounter: 191 lb (86.6 kg). Physical Exam  Vitals signs reviewed. Constitutional:       General: She is not in acute distress. Appearance: Normal appearance. HENT:      Head: Normocephalic and atraumatic. Right Ear: External ear normal.      Left Ear: External ear normal.   Eyes:      Conjunctiva/sclera: Conjunctivae normal.   Pulmonary:      Effort: Pulmonary effort is normal.   Musculoskeletal: Normal range of motion. Skin:     General: Skin is dry. Findings: No erythema or rash. Neurological:      General: No focal deficit present. Mental Status: She is alert and oriented to person, place, and time.    Psychiatric:         Mood and Affect: Mood normal. Behavior: Behavior normal.         Thought Content: Thought content normal.         Judgment: Judgment normal.         ASSESSMENT/PLAN:  1. Postimmunization reaction, subsequent encounter  - discussed options, will continue to gather information regarding best practices after possible vaccine reaction and keep patient updated, verbalizes understanding      Return if symptoms worsen or fail to improve. An  electronic signature was used to authenticate this note.     --Yonny Escamilla, APRN - CNP on 3/10/2021 at 2:48 PM

## 2021-04-09 ENCOUNTER — PATIENT MESSAGE (OUTPATIENT)
Dept: FAMILY MEDICINE CLINIC | Age: 36
End: 2021-04-09

## 2021-04-12 NOTE — TELEPHONE ENCOUNTER
From: Lily Shaver  To: DANN Cardoza - CNP  Sent: 4/9/2021 8:53 AM EDT  Subject: Visit Arlene Cranker Dr. Nalani Dus,    I wanted to follow up on my visit with you about a month ago regarding my allergic reaction to the [facility] vaccine. You mentioned that you were going to look around more to see what would be possible options for me moving forward with a possible second dose of a vaccine. What are your thoughts? I just wanted to check back with you before too much time has passed from my first dose. Thanks!   Lily Shaver

## 2021-07-13 ENCOUNTER — HOSPITAL ENCOUNTER (OUTPATIENT)
Age: 36
Setting detail: SPECIMEN
Discharge: HOME | End: 2021-07-13
Payer: COMMERCIAL

## 2021-07-13 ENCOUNTER — OFFICE VISIT (OUTPATIENT)
Dept: OBGYN | Age: 36
End: 2021-07-13
Payer: COMMERCIAL

## 2021-07-13 VITALS
OXYGEN SATURATION: 99 % | BODY MASS INDEX: 29.04 KG/M2 | HEART RATE: 70 BPM | WEIGHT: 191.6 LBS | HEIGHT: 68 IN | SYSTOLIC BLOOD PRESSURE: 106 MMHG | DIASTOLIC BLOOD PRESSURE: 68 MMHG

## 2021-07-13 DIAGNOSIS — Z12.4 SCREENING FOR MALIGNANT NEOPLASM OF CERVIX: ICD-10-CM

## 2021-07-13 DIAGNOSIS — Z01.419 WOMEN'S ANNUAL ROUTINE GYNECOLOGICAL EXAMINATION: Primary | ICD-10-CM

## 2021-07-13 PROCEDURE — 87624 HPV HI-RISK TYP POOLED RSLT: CPT

## 2021-07-13 PROCEDURE — G0145 SCR C/V CYTO,THINLAYER,RESCR: HCPCS

## 2021-07-13 PROCEDURE — 99395 PREV VISIT EST AGE 18-39: CPT | Performed by: ADVANCED PRACTICE MIDWIFE

## 2021-07-13 RX ORDER — PENICILLIN V POTASSIUM 500 MG/1
TABLET ORAL
COMMUNITY
Start: 2021-07-02 | End: 2022-07-20 | Stop reason: ALTCHOICE

## 2021-07-13 ASSESSMENT — PATIENT HEALTH QUESTIONNAIRE - PHQ9
SUM OF ALL RESPONSES TO PHQ9 QUESTIONS 1 & 2: 0
SUM OF ALL RESPONSES TO PHQ QUESTIONS 1-9: 0
2. FEELING DOWN, DEPRESSED OR HOPELESS: 0
1. LITTLE INTEREST OR PLEASURE IN DOING THINGS: 0
SUM OF ALL RESPONSES TO PHQ QUESTIONS 1-9: 0
SUM OF ALL RESPONSES TO PHQ QUESTIONS 1-9: 0

## 2021-07-13 ASSESSMENT — ENCOUNTER SYMPTOMS
GASTROINTESTINAL NEGATIVE: 1
RESPIRATORY NEGATIVE: 1
EYES NEGATIVE: 1

## 2021-07-13 NOTE — PROGRESS NOTES
Subjective:      Patient ID: Delmer Lala  is a 29 y.o. y.o. female. Lucy German presents today for annual examination. She reports monthly menses lasting 5 days requiring super tampon change every 2-3 hours, no clots and menstrual pain she would rate 3-4/10. She uses tylenol for the pain. She is , sexually active and no pain or bleeding with intercourse. She reports they have three children together. 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 Contraception: spouse vasectomy. Review of Systems   Constitutional: Negative. HENT: Negative. Eyes: Negative. Respiratory: Negative. Cardiovascular: Negative. Gastrointestinal: Negative. Genitourinary: Negative. Musculoskeletal: Negative. Skin: Negative. Neurological: Negative. Psychiatric/Behavioral: Negative. Breast ROS: negative    Objective:   /68   Pulse 70   Ht 5' 8\" (1.727 m)   Wt 191 lb 9.6 oz (86.9 kg)   SpO2 99%   BMI 29.13 kg/m²   Physical Exam  Constitutional:       Appearance: She is well-developed. HENT:      Head: Normocephalic and atraumatic. Eyes:      Conjunctiva/sclera: Conjunctivae normal.   Cardiovascular:      Rate and Rhythm: Normal rate and regular rhythm. Heart sounds: Normal heart sounds. Pulmonary:      Effort: Pulmonary effort is normal.      Breath sounds: Normal breath sounds. Chest:      Breasts: Breasts are symmetrical.         Right: No inverted nipple, mass, nipple discharge, skin change or tenderness. Left: No inverted nipple, mass, nipple discharge, skin change or tenderness. Abdominal:      Palpations: Abdomen is soft. Genitourinary:     General: Normal vulva. Vagina: Normal.      Comments: External genitalia WNL, no lesions noted. Vaginal canal is pink with rugae present and normal appearing nonodorous discharge. Cervix is parous, freely mobile and nontender. Uterus is NSSAVNT, adnexa are small, freely mobile and nontender.  No abnormalities noted.  Musculoskeletal:         General: Normal range of motion. Cervical back: Normal range of motion and neck supple. Skin:     General: Skin is warm and dry. Neurological:      Mental Status: She is alert and oriented to person, place, and time. Deep Tendon Reflexes: Reflexes are normal and symmetric. Psychiatric:         Mood and Affect: Mood normal.         Thought Content: Thought content normal.         Sexually active: Yes  Any bleeding or pain withintercourse: No  Last Yearly:    Last Pap 6/2020,  Last HPV 6/2020, High Risk HPV Negative  8?2016 Negative GUDELIA  Last Mammogram:   Do you do self breast exams: Yes  Family H/O breast CA MGM, diagnoses age 63's, Vened[place] Creed. Aunt Cancer, type unknown in 40's, remainder negative. Assessment:      Diagnosis Orders   1. Women's annual routine gynecological examination     2. Screening for malignant neoplasm of cervix  PAP SMEAR           Plan:      Orders Placed This Encounter   Procedures    PAP SMEAR     Patient History:    No LMP recorded. OBGYN Status: Having periods  Past Surgical History:  No date: WISDOM TOOTH EXTRACTION      Comment:  age 16 - 4 teeth      Social History    Tobacco Use      Smoking status: Never Smoker      Smokeless tobacco: Never Used       Standing Status:   Future     Standing Expiration Date:   7/28/2022     Order Specific Question:   Collection Type     Answer: Thin Prep     Order Specific Question:   Prior Abnormal Pap Test     Answer:   Yes     Order Specific Question:   If Prior Abnormal, Give Date     Answer:   06/01/2020-ASCUS Neg HPV     Order Specific Question:   Prior Treatment     Answer: Other     Comments:   repeat     Order Specific Question:   Screening or Diagnostic     Answer:   Screening     Order Specific Question:   HPV Requested?      Answer:   Yes     Order Specific Question:   High Risk Patient     Answer:   N/A   Education: discussed diet with calcium intake 2148-8934 mg./day with weight bearing exercise 30-50 minutes 3-5x/week. SBE monthly, danger S&S, screening mammogram starting age 36. Ds'd new pap guidelines.  RTO 12 months and prn

## 2021-07-16 LAB — CYTOLOGY REPORT: NORMAL

## 2022-07-20 ENCOUNTER — OFFICE VISIT (OUTPATIENT)
Dept: OBGYN | Age: 37
End: 2022-07-20
Payer: COMMERCIAL

## 2022-07-20 ENCOUNTER — HOSPITAL ENCOUNTER (OUTPATIENT)
Age: 37
Setting detail: SPECIMEN
Discharge: HOME | End: 2022-07-20
Payer: COMMERCIAL

## 2022-07-20 VITALS
DIASTOLIC BLOOD PRESSURE: 66 MMHG | HEART RATE: 59 BPM | OXYGEN SATURATION: 100 % | SYSTOLIC BLOOD PRESSURE: 118 MMHG | WEIGHT: 223.8 LBS | BODY MASS INDEX: 34.03 KG/M2

## 2022-07-20 DIAGNOSIS — Z12.4 SCREENING FOR MALIGNANT NEOPLASM OF CERVIX: ICD-10-CM

## 2022-07-20 DIAGNOSIS — Z01.419 WOMEN'S ANNUAL ROUTINE GYNECOLOGICAL EXAMINATION: Primary | ICD-10-CM

## 2022-07-20 PROCEDURE — G0145 SCR C/V CYTO,THINLAYER,RESCR: HCPCS

## 2022-07-20 PROCEDURE — 99395 PREV VISIT EST AGE 18-39: CPT | Performed by: ADVANCED PRACTICE MIDWIFE

## 2022-07-20 ASSESSMENT — PATIENT HEALTH QUESTIONNAIRE - PHQ9
SUM OF ALL RESPONSES TO PHQ QUESTIONS 1-9: 0
1. LITTLE INTEREST OR PLEASURE IN DOING THINGS: 0
2. FEELING DOWN, DEPRESSED OR HOPELESS: 0
2. FEELING DOWN, DEPRESSED OR HOPELESS: 0
SUM OF ALL RESPONSES TO PHQ QUESTIONS 1-9: 0
1. LITTLE INTEREST OR PLEASURE IN DOING THINGS: 0
SUM OF ALL RESPONSES TO PHQ9 QUESTIONS 1 & 2: 0
SUM OF ALL RESPONSES TO PHQ9 QUESTIONS 1 & 2: 0
SUM OF ALL RESPONSES TO PHQ QUESTIONS 1-9: 0
SUM OF ALL RESPONSES TO PHQ QUESTIONS 1-9: 0

## 2022-07-20 ASSESSMENT — ENCOUNTER SYMPTOMS
GASTROINTESTINAL NEGATIVE: 1
RESPIRATORY NEGATIVE: 1
EYES NEGATIVE: 1

## 2022-07-20 NOTE — PROGRESS NOTES
Chaperone for Intimate Exam  Chaperone was offered as part of the rooming process. Patient declined and agrees to continue with exam without a chaperone.   Chaperone: Jose Luis Baez LPN

## 2022-07-20 NOTE — PROGRESS NOTES
Subjective:      Patient ID: Octavio Silva  is a 40 y.o. y.o. female. Laureano Yao presents today for an annual examination. She reports no concerns at this time. She reports monthly menses, bleeding for 4-5 days with one heavy day requiring a super tampon change every 3-4 hours. She has no clots and reports mild menstrual pain she would rate 2/10. She on occasion may use tylenol or ibuprofen for the pain. She is  and sexually active, she denies pain or bleeding with intercourse. Review of Systems   Constitutional: Negative. HENT: Negative. Eyes: Negative. Respiratory: Negative. Cardiovascular: Negative. Gastrointestinal: Negative. Genitourinary: Negative. Musculoskeletal: Negative. Skin: Negative. Neurological: Negative. Psychiatric/Behavioral: Negative. Breast ROS: negative    Objective:   /66 (Site: Right Upper Arm, Position: Sitting)   Pulse 59   Wt 223 lb 12.8 oz (101.5 kg)   SpO2 100%   BMI 34.03 kg/m²   Physical Exam  Constitutional:       Appearance: She is well-developed. HENT:      Head: Normocephalic and atraumatic. Eyes:      Conjunctiva/sclera: Conjunctivae normal.   Cardiovascular:      Rate and Rhythm: Normal rate and regular rhythm. Heart sounds: Normal heart sounds. Pulmonary:      Effort: Pulmonary effort is normal.      Breath sounds: Normal breath sounds. Chest:   Breasts:     Breasts are symmetrical.      Right: No inverted nipple, mass, nipple discharge, skin change or tenderness. Left: No inverted nipple, mass, nipple discharge, skin change or tenderness. Abdominal:      Palpations: Abdomen is soft. Genitourinary:     General: Normal vulva. Vagina: Normal.      Comments: External genitalia WNL, no lesions noted. Vaginal canal is pink with rugae present and normal appearing nonodorous discharge. Cervix is parous, freely mobile and nontender. Uterus is NSSAVNT, adnexa are small, freely mobile and nontender.  No abnormalities noted. Musculoskeletal:         General: Normal range of motion. Cervical back: Normal range of motion and neck supple. Skin:     General: Skin is warm and dry. Neurological:      Mental Status: She is alert and oriented to person, place, and time. Deep Tendon Reflexes: Reflexes are normal and symmetric. Psychiatric:         Mood and Affect: Mood normal.         Thought Content: Thought content normal.       Sexually active: Yes  Any bleeding or pain withintercourse: No  Last Yearly:  7/2021  Last Pap 7/2021 ASCUS, HR HPV Negative  Negative H/O Abnormal pap smears  Last Mammogram: No previous  Do you do self breast exams: Yes  Family H/O Breast Cancer Paternal [de-identified], age 52's  Assessment:      Diagnosis Orders   1. Women's annual routine gynecological examination        2. Screening for malignant neoplasm of cervix  PAP SMEAR              Plan:      Orders Placed This Encounter   Procedures    PAP SMEAR     Patient History:    No LMP recorded. OBGYN Status: Having periods  Past Surgical History:  No date: WISDOM TOOTH EXTRACTION      Comment:  age 16 - 4 teeth      Social History    Tobacco Use      Smoking status: Never      Smokeless tobacco: Never       Standing Status:   Future     Standing Expiration Date:   7/20/2023     Order Specific Question:   Collection Type     Answer: Thin Prep     Order Specific Question:   Prior Abnormal Pap Test     Answer:   No     Order Specific Question:   Screening or Diagnostic     Answer:   Screening     Order Specific Question:   HPV Requested? Answer:   Yes - If Abnormal Reflex HPV     Order Specific Question:   High Risk Patient     Answer:   N/A   Education: discussed diet with hydration, calcium intake 2273-9831 mg./day and weight bearing exercise 30-50 minutes 3-5x/week. SBE monthly and will start screening mammogram age 37. Ds'd new pap guidelines. RTO 12 months and prn.

## 2022-07-28 LAB — CYTOLOGY REPORT: NORMAL

## 2022-12-12 ENCOUNTER — OFFICE VISIT (OUTPATIENT)
Dept: FAMILY MEDICINE CLINIC | Age: 37
End: 2022-12-12
Payer: COMMERCIAL

## 2022-12-12 VITALS
DIASTOLIC BLOOD PRESSURE: 86 MMHG | TEMPERATURE: 98.8 F | OXYGEN SATURATION: 98 % | BODY MASS INDEX: 33.52 KG/M2 | WEIGHT: 221.2 LBS | HEIGHT: 68 IN | SYSTOLIC BLOOD PRESSURE: 124 MMHG | RESPIRATION RATE: 16 BRPM | HEART RATE: 85 BPM

## 2022-12-12 DIAGNOSIS — J06.9 VIRAL URI: ICD-10-CM

## 2022-12-12 DIAGNOSIS — J10.1 INFLUENZA A: Primary | ICD-10-CM

## 2022-12-12 LAB
INFLUENZA A ANTIGEN, POC: POSITIVE
INFLUENZA B ANTIGEN, POC: NEGATIVE
LOT EXPIRE DATE: NORMAL
LOT KIT NUMBER: NORMAL
SARS-COV-2, POC: NORMAL
VALID INTERNAL CONTROL: NORMAL
VENDOR AND KIT NAME POC: NORMAL

## 2022-12-12 PROCEDURE — 99213 OFFICE O/P EST LOW 20 MIN: CPT | Performed by: NURSE PRACTITIONER

## 2022-12-12 PROCEDURE — 87428 SARSCOV & INF VIR A&B AG IA: CPT | Performed by: NURSE PRACTITIONER

## 2022-12-12 RX ORDER — OSELTAMIVIR PHOSPHATE 75 MG/1
75 CAPSULE ORAL 2 TIMES DAILY
Qty: 10 CAPSULE | Refills: 0 | Status: SHIPPED | OUTPATIENT
Start: 2022-12-12 | End: 2022-12-17

## 2022-12-12 SDOH — ECONOMIC STABILITY: FOOD INSECURITY: WITHIN THE PAST 12 MONTHS, THE FOOD YOU BOUGHT JUST DIDN'T LAST AND YOU DIDN'T HAVE MONEY TO GET MORE.: NEVER TRUE

## 2022-12-12 SDOH — ECONOMIC STABILITY: FOOD INSECURITY: WITHIN THE PAST 12 MONTHS, YOU WORRIED THAT YOUR FOOD WOULD RUN OUT BEFORE YOU GOT MONEY TO BUY MORE.: NEVER TRUE

## 2022-12-12 ASSESSMENT — PATIENT HEALTH QUESTIONNAIRE - PHQ9
SUM OF ALL RESPONSES TO PHQ9 QUESTIONS 1 & 2: 0
SUM OF ALL RESPONSES TO PHQ QUESTIONS 1-9: 0
1. LITTLE INTEREST OR PLEASURE IN DOING THINGS: 0
SUM OF ALL RESPONSES TO PHQ QUESTIONS 1-9: 0
2. FEELING DOWN, DEPRESSED OR HOPELESS: 0

## 2022-12-12 ASSESSMENT — SOCIAL DETERMINANTS OF HEALTH (SDOH): HOW HARD IS IT FOR YOU TO PAY FOR THE VERY BASICS LIKE FOOD, HOUSING, MEDICAL CARE, AND HEATING?: NOT HARD AT ALL

## 2022-12-12 ASSESSMENT — ENCOUNTER SYMPTOMS
VOMITING: 0
SORE THROAT: 1
NAUSEA: 0
RHINORRHEA: 1
COUGH: 1
DIARRHEA: 0

## 2022-12-12 NOTE — PROGRESS NOTES
[address] [place] [address] [address] [facility], APRN-[facility]  [address]  Phone:  [phone]  Fax:  [phone]  Dipak Hightower is a 40 y.o. female who presents today for her medical conditions/complaints as noted below. Dipak Hightower c/o of URI (Pt presents to walk in with complaints of cough, congestion, and body aches since Saturday morning. Is a teacher at [employer])      HPI:     URI   This is a new problem. The current episode started in the past 7 days (12/10  Teacher at [employer]). There has been no fever. Associated symptoms include congestion, coughing, headaches, rhinorrhea and a sore throat. Pertinent negatives include no diarrhea, nausea, rash or vomiting.      Wt Readings from Last 3 Encounters:   12/12/22 221 lb 3.2 oz (100.3 kg)   07/20/22 223 lb 12.8 oz (101.5 kg)   07/13/21 191 lb 9.6 oz (86.9 kg)       Temp Readings from Last 3 Encounters:   12/12/22 98.8 °F (37.1 °C) (Tympanic)   03/10/21 98.6 °F (37 °C) (Tympanic)   03/01/21 98.4 °F (36.9 °C)       BP Readings from Last 3 Encounters:   12/12/22 124/86   07/20/22 118/66   07/13/21 106/68       Pulse Readings from Last 3 Encounters:   12/12/22 85   07/20/22 59   07/13/21 70        SpO2 Readings from Last 3 Encounters:   12/12/22 98%   07/20/22 100%   07/13/21 99%             Past Medical History:   Diagnosis Date    Abnormal Pap smear of cervix     Anemia     Vaginal delivery 5/15/15    Girl, no complications      Past Surgical History:   Procedure Laterality Date    WISDOM TOOTH EXTRACTION      age 16 - 3 teeth     Family History   Problem Relation Age of Onset    Breast Cancer Maternal Grandmother 79     Social History     Tobacco Use    Smoking status: Never    Smokeless tobacco: Never   Substance Use Topics    Alcohol use: No      Current Outpatient Medications   Medication Sig Dispense Refill    oseltamivir (TAMIFLU) 75 MG capsule Take 1 capsule by mouth 2 times daily for 5 days 10 capsule 0    VITAMIN D, ERGOCALCIFEROL, PO Take by mouth (Patient not taking: No sig reported)      ferrous sulfate (IRON 325) 325 (65 Fe) MG tablet Take 1 tablet by mouth 2 times daily (with meals) (Patient not taking: No sig reported) 180 tablet 1    Multiple Vitamins-Minerals (MULTIVITAMIN ADULT PO) Take by mouth daily (Patient not taking: No sig reported)       No current facility-administered medications for this visit. No Known Allergies    No results found. Subjective:      Review of Systems   Constitutional:  Positive for chills, diaphoresis and fatigue. Negative for fever. HENT:  Positive for congestion, rhinorrhea and sore throat. Respiratory:  Positive for cough. Gastrointestinal:  Negative for diarrhea, nausea and vomiting. Musculoskeletal:  Positive for myalgias. Skin:  Negative for rash. Neurological:  Positive for headaches. Objective:     /86 (Site: Right Upper Arm, Position: Sitting, Cuff Size: Medium Adult)   Pulse 85   Temp 98.8 °F (37.1 °C) (Tympanic)   Resp 16   Ht 5' 8\" (1.727 m)   Wt 221 lb 3.2 oz (100.3 kg)   SpO2 98%   BMI 33.63 kg/m²     Physical Exam  Vitals and nursing note reviewed. Constitutional:       General: She is not in acute distress. HENT:      Head: Normocephalic and atraumatic. Right Ear: Tympanic membrane, ear canal and external ear normal.      Left Ear: Tympanic membrane, ear canal and external ear normal.      Nose: Mucosal edema, congestion and rhinorrhea present. Mouth/Throat:      Pharynx: No oropharyngeal exudate or posterior oropharyngeal erythema. Eyes:      Conjunctiva/sclera: Conjunctivae normal.      Pupils: Pupils are equal, round, and reactive to light. Cardiovascular:      Rate and Rhythm: Normal rate and regular rhythm. Heart sounds: Normal heart sounds. Pulmonary:      Effort: Pulmonary effort is normal. No respiratory distress. Breath sounds: Normal breath sounds. No wheezing. Abdominal:      Tenderness:  There is no abdominal tenderness. Musculoskeletal:         General: Normal range of motion. Cervical back: Normal range of motion and neck supple. Lymphadenopathy:      Cervical: No cervical adenopathy. Skin:     General: Skin is warm and dry. Capillary Refill: Capillary refill takes less than 2 seconds. Findings: No rash. Neurological:      General: No focal deficit present. Mental Status: She is alert and oriented to person, place, and time. Psychiatric:         Judgment: Judgment normal.       Assessment:      Diagnosis Orders   1. Influenza A  oseltamivir (TAMIFLU) 75 MG capsule      2. Viral URI  POCT COVID-19 & Influenza A/B        No results found for this visit on 12/12/22. Plan:     Tamiflu as directed for influenza. Note for the week off. Follow up with primary care provider in 1 to 2 days if needed. Treat with over the counter medications only if age appropriate. May use Tylenol or Ibuprofen for pain or fever above 101. Over the counter cough syrup if over the age of 10. Antibiotics are not indicated at the present time. Cough treatment if over the age of 1 - 1 teaspoon of (Local honey if able to find) honey mixed with squeezed  fresh lemon juice. Mix in warm water or take directly. This decreases sore throat pain and reduces cough. May be repeated every 2 hours as needed for cough. Advised to follow up if does not get better or symptoms worsen. Go to Urgent Care of ER if you become dehydrated, have breathing difficulty, or have extreme weakness. Cough may last up to 4. Patient Instructions   Tamiflu as directed for influenza. Note for the week off. Follow up with primary care provider in 1 to 2 days if needed. Treat with over the counter medications only if age appropriate. May use Tylenol or Ibuprofen for pain or fever above 101. Over the counter cough syrup if over the age of 10. Antibiotics are not indicated at the present time.  Cough treatment if over the age of 1 - 1 teaspoon of (Local honey if able to find) honey mixed with squeezed  fresh lemon juice. Mix in warm water or take directly. This decreases sore throat pain and reduces cough. May be repeated every 2 hours as needed for cough. Advised to follow up if does not get better or symptoms worsen. Go to Urgent Care of ER if you become dehydrated, have breathing difficulty, or have extreme weakness. Cough may last up to 4 weeks. Patient/Caregiver instructed on use, benefit, and side effects of prescribed medications. All patient/parent/caregiver questions answered. Patient/parent/caregiver voiced understanding. Reviewed health maintenance. Instructed to continue current medications, diet and exercise. Patient agreed with treatment plan. Follow up as directed.            Electronically signed by DANN Alonso NP on12/12/2022

## 2022-12-12 NOTE — PATIENT INSTRUCTIONS
Tamiflu as directed for influenza. Note for the week off. Follow up with primary care provider in 1 to 2 days if needed. Treat with over the counter medications only if age appropriate. May use Tylenol or Ibuprofen for pain or fever above 101. Over the counter cough syrup if over the age of 10. Antibiotics are not indicated at the present time. Cough treatment if over the age of 1 - 1 teaspoon of (Local honey if able to find) honey mixed with squeezed  fresh lemon juice. Mix in warm water or take directly. This decreases sore throat pain and reduces cough. May be repeated every 2 hours as needed for cough. Advised to follow up if does not get better or symptoms worsen. Go to Urgent Care of ER if you become dehydrated, have breathing difficulty, or have extreme weakness. Cough may last up to 4 weeks.

## 2023-07-26 ENCOUNTER — OFFICE VISIT (OUTPATIENT)
Dept: OBGYN | Age: 38
End: 2023-07-26
Payer: COMMERCIAL

## 2023-07-26 VITALS
DIASTOLIC BLOOD PRESSURE: 62 MMHG | SYSTOLIC BLOOD PRESSURE: 100 MMHG | BODY MASS INDEX: 32.19 KG/M2 | WEIGHT: 212.4 LBS | HEART RATE: 73 BPM | HEIGHT: 68 IN

## 2023-07-26 DIAGNOSIS — Z01.419 WOMEN'S ANNUAL ROUTINE GYNECOLOGICAL EXAMINATION: Primary | ICD-10-CM

## 2023-07-26 PROCEDURE — 99395 PREV VISIT EST AGE 18-39: CPT | Performed by: ADVANCED PRACTICE MIDWIFE

## 2023-07-26 SDOH — ECONOMIC STABILITY: INCOME INSECURITY: HOW HARD IS IT FOR YOU TO PAY FOR THE VERY BASICS LIKE FOOD, HOUSING, MEDICAL CARE, AND HEATING?: NOT HARD AT ALL

## 2023-07-26 SDOH — ECONOMIC STABILITY: FOOD INSECURITY: WITHIN THE PAST 12 MONTHS, YOU WORRIED THAT YOUR FOOD WOULD RUN OUT BEFORE YOU GOT MONEY TO BUY MORE.: NEVER TRUE

## 2023-07-26 SDOH — ECONOMIC STABILITY: FOOD INSECURITY: WITHIN THE PAST 12 MONTHS, THE FOOD YOU BOUGHT JUST DIDN'T LAST AND YOU DIDN'T HAVE MONEY TO GET MORE.: NEVER TRUE

## 2023-07-26 SDOH — ECONOMIC STABILITY: HOUSING INSECURITY
IN THE LAST 12 MONTHS, WAS THERE A TIME WHEN YOU DID NOT HAVE A STEADY PLACE TO SLEEP OR SLEPT IN A SHELTER (INCLUDING NOW)?: NO

## 2023-07-26 ASSESSMENT — PATIENT HEALTH QUESTIONNAIRE - PHQ9
SUM OF ALL RESPONSES TO PHQ QUESTIONS 1-9: 0
2. FEELING DOWN, DEPRESSED OR HOPELESS: 0
1. LITTLE INTEREST OR PLEASURE IN DOING THINGS: 0
SUM OF ALL RESPONSES TO PHQ QUESTIONS 1-9: 0
SUM OF ALL RESPONSES TO PHQ QUESTIONS 1-9: 0
SUM OF ALL RESPONSES TO PHQ9 QUESTIONS 1 & 2: 0
SUM OF ALL RESPONSES TO PHQ QUESTIONS 1-9: 0

## 2023-07-26 ASSESSMENT — ENCOUNTER SYMPTOMS
RESPIRATORY NEGATIVE: 1
EYES NEGATIVE: 1
GASTROINTESTINAL NEGATIVE: 1

## 2024-07-29 ENCOUNTER — HOSPITAL ENCOUNTER (OUTPATIENT)
Age: 39
Setting detail: SPECIMEN
Discharge: HOME | End: 2024-07-29
Payer: COMMERCIAL

## 2024-07-29 ENCOUNTER — OFFICE VISIT (OUTPATIENT)
Dept: OBGYN | Age: 39
End: 2024-07-29
Payer: COMMERCIAL

## 2024-07-29 VITALS
WEIGHT: 223.6 LBS | SYSTOLIC BLOOD PRESSURE: 106 MMHG | HEIGHT: 69 IN | BODY MASS INDEX: 33.12 KG/M2 | OXYGEN SATURATION: 100 % | HEART RATE: 67 BPM | DIASTOLIC BLOOD PRESSURE: 70 MMHG

## 2024-07-29 DIAGNOSIS — Z12.4 SCREENING FOR MALIGNANT NEOPLASM OF CERVIX: ICD-10-CM

## 2024-07-29 DIAGNOSIS — Z01.419 WOMEN'S ANNUAL ROUTINE GYNECOLOGICAL EXAMINATION: Primary | ICD-10-CM

## 2024-07-29 PROCEDURE — 99395 PREV VISIT EST AGE 18-39: CPT | Performed by: ADVANCED PRACTICE MIDWIFE

## 2024-07-29 PROCEDURE — 87624 HPV HI-RISK TYP POOLED RSLT: CPT

## 2024-07-29 PROCEDURE — G0145 SCR C/V CYTO,THINLAYER,RESCR: HCPCS

## 2024-07-29 ASSESSMENT — ENCOUNTER SYMPTOMS
EYES NEGATIVE: 1
GASTROINTESTINAL NEGATIVE: 1
RESPIRATORY NEGATIVE: 1

## 2024-07-29 ASSESSMENT — PATIENT HEALTH QUESTIONNAIRE - PHQ9
2. FEELING DOWN, DEPRESSED OR HOPELESS: NOT AT ALL
SUM OF ALL RESPONSES TO PHQ QUESTIONS 1-9: 0
1. LITTLE INTEREST OR PLEASURE IN DOING THINGS: NOT AT ALL
SUM OF ALL RESPONSES TO PHQ QUESTIONS 1-9: 0
SUM OF ALL RESPONSES TO PHQ9 QUESTIONS 1 & 2: 0
SUM OF ALL RESPONSES TO PHQ QUESTIONS 1-9: 0
SUM OF ALL RESPONSES TO PHQ QUESTIONS 1-9: 0

## 2024-07-29 NOTE — PROGRESS NOTES
Subjective:      Patient ID: Bernadette Smith  is a 39 y.o. y.o. female.    Bernadette presents today for an annual examination. She reports no concerns at this time. She reports monthly menses, bleeding for 4-5 days with one heavy day requiring a super tampon change every 3-4 hours. She has minimal small stringy clots and reports mild menstrual pain she would rate 3/10 on the heaviest day. She on occasion may use tylenol or ibuprofen for the pain. She is  and sexually active, she denies pain or bleeding with intercourse. Spouse vasectomy.        Review of Systems   Constitutional: Negative.    HENT: Negative.     Eyes: Negative.    Respiratory: Negative.     Cardiovascular: Negative.    Gastrointestinal: Negative.    Genitourinary: Negative.    Musculoskeletal: Negative.    Skin: Negative.    Neurological: Negative.    Psychiatric/Behavioral: Negative.     Breast ROS: negative    Objective:   /70 (Site: Right Upper Arm, Position: Sitting, Cuff Size: Large Adult)   Pulse 67   Ht 1.761 m (5' 9.33\")   Wt 101.4 kg (223 lb 9.6 oz)   LMP 07/08/2024 (Exact Date)   SpO2 100%   Breastfeeding No   BMI 32.71 kg/m²   Physical Exam  Constitutional:       Appearance: She is well-developed. She is obese.   HENT:      Head: Normocephalic and atraumatic.   Eyes:      Conjunctiva/sclera: Conjunctivae normal.   Cardiovascular:      Rate and Rhythm: Normal rate and regular rhythm.      Heart sounds: Normal heart sounds.   Pulmonary:      Effort: Pulmonary effort is normal.      Breath sounds: Normal breath sounds.   Chest:   Breasts:     Breasts are symmetrical.      Right: No inverted nipple, mass, nipple discharge, skin change or tenderness.      Left: No inverted nipple, mass, nipple discharge, skin change or tenderness.   Abdominal:      Palpations: Abdomen is soft.   Genitourinary:     General: Normal vulva.      Vagina: Normal.      Rectum: Normal.      Comments:  External genitalia WNL, no lesions noted. Vaginal

## 2024-07-30 LAB
HPV I/H RISK 4 DNA CVX QL NAA+PROBE: NOT DETECTED
HPV SAMPLE: NORMAL
HPV, INTERPRETATION: NORMAL
HPV16 DNA CVX QL NAA+PROBE: NOT DETECTED
HPV18 DNA CVX QL NAA+PROBE: NOT DETECTED
SPECIMEN DESCRIPTION: NORMAL

## 2024-08-07 LAB — CYTOLOGY REPORT: NORMAL
